# Patient Record
Sex: FEMALE | Race: BLACK OR AFRICAN AMERICAN | Employment: OTHER | URBAN - METROPOLITAN AREA
[De-identification: names, ages, dates, MRNs, and addresses within clinical notes are randomized per-mention and may not be internally consistent; named-entity substitution may affect disease eponyms.]

---

## 2018-01-30 ENCOUNTER — OP HISTORICAL/CONVERTED ENCOUNTER (OUTPATIENT)
Dept: OTHER | Age: 73
End: 2018-01-30

## 2018-06-07 ENCOUNTER — OP HISTORICAL/CONVERTED ENCOUNTER (OUTPATIENT)
Dept: OTHER | Age: 73
End: 2018-06-07

## 2019-03-11 ENCOUNTER — OP HISTORICAL/CONVERTED ENCOUNTER (OUTPATIENT)
Dept: OTHER | Age: 74
End: 2019-03-11

## 2019-03-26 ENCOUNTER — OP HISTORICAL/CONVERTED ENCOUNTER (OUTPATIENT)
Dept: OTHER | Age: 74
End: 2019-03-26

## 2019-04-08 ENCOUNTER — OP HISTORICAL/CONVERTED ENCOUNTER (OUTPATIENT)
Dept: OTHER | Age: 74
End: 2019-04-08

## 2020-03-11 ENCOUNTER — OP HISTORICAL/CONVERTED ENCOUNTER (OUTPATIENT)
Dept: OTHER | Age: 75
End: 2020-03-11

## 2021-03-08 ENCOUNTER — TRANSCRIBE ORDER (OUTPATIENT)
Dept: SCHEDULING | Age: 76
End: 2021-03-08

## 2021-03-08 DIAGNOSIS — Z12.31 SCREENING MAMMOGRAM FOR HIGH-RISK PATIENT: Primary | ICD-10-CM

## 2021-03-11 ENCOUNTER — HOSPITAL ENCOUNTER (OUTPATIENT)
Dept: MAMMOGRAPHY | Age: 76
Discharge: HOME | End: 2021-03-11
Attending: OBSTETRICS & GYNECOLOGY
Payer: MEDICARE

## 2021-03-11 DIAGNOSIS — Z12.31 SCREENING MAMMOGRAM FOR HIGH-RISK PATIENT: ICD-10-CM

## 2021-03-11 PROCEDURE — 77063 BREAST TOMOSYNTHESIS BI: CPT

## 2021-04-29 ENCOUNTER — OFFICE VISIT (OUTPATIENT)
Dept: OBGYN CLINIC | Age: 76
End: 2021-04-29
Payer: MEDICARE

## 2021-04-29 VITALS
SYSTOLIC BLOOD PRESSURE: 128 MMHG | DIASTOLIC BLOOD PRESSURE: 59 MMHG | RESPIRATION RATE: 16 BRPM | WEIGHT: 150 LBS | HEART RATE: 57 BPM | BODY MASS INDEX: 27.6 KG/M2 | HEIGHT: 62 IN | OXYGEN SATURATION: 98 %

## 2021-04-29 DIAGNOSIS — Z01.419 GYNECOLOGIC EXAM NORMAL: Primary | ICD-10-CM

## 2021-04-29 DIAGNOSIS — Z12.72 SCREENING FOR MALIGNANT NEOPLASM OF VAGINA AFTER TOTAL HYSTERECTOMY: ICD-10-CM

## 2021-04-29 DIAGNOSIS — Z90.710 SCREENING FOR MALIGNANT NEOPLASM OF VAGINA AFTER TOTAL HYSTERECTOMY: ICD-10-CM

## 2021-04-29 PROCEDURE — G8510 SCR DEP NEG, NO PLAN REQD: HCPCS | Performed by: OBSTETRICS & GYNECOLOGY

## 2021-04-29 PROCEDURE — G8427 DOCREV CUR MEDS BY ELIG CLIN: HCPCS | Performed by: OBSTETRICS & GYNECOLOGY

## 2021-04-29 PROCEDURE — G8419 CALC BMI OUT NRM PARAM NOF/U: HCPCS | Performed by: OBSTETRICS & GYNECOLOGY

## 2021-04-29 PROCEDURE — G0101 CA SCREEN;PELVIC/BREAST EXAM: HCPCS | Performed by: OBSTETRICS & GYNECOLOGY

## 2021-04-29 PROCEDURE — G8536 NO DOC ELDER MAL SCRN: HCPCS | Performed by: OBSTETRICS & GYNECOLOGY

## 2021-04-29 NOTE — PATIENT INSTRUCTIONS
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
## 2021-05-03 LAB
CYTOLOGIST CVX/VAG CYTO: NORMAL
CYTOLOGY CVX/VAG DOC CYTO: NORMAL
CYTOLOGY CVX/VAG DOC THIN PREP: NORMAL
DX ICD CODE: NORMAL
LABCORP, 190119: NORMAL
Lab: NORMAL
Lab: NORMAL
OTHER STN SPEC: NORMAL
STAT OF ADQ CVX/VAG CYTO-IMP: NORMAL

## 2021-11-02 ENCOUNTER — TRANSCRIBE ORDER (OUTPATIENT)
Dept: SCHEDULING | Age: 76
End: 2021-11-02

## 2021-11-02 DIAGNOSIS — Z12.31 SCREENING MAMMOGRAM FOR HIGH-RISK PATIENT: Primary | ICD-10-CM

## 2022-03-16 ENCOUNTER — HOSPITAL ENCOUNTER (OUTPATIENT)
Dept: MAMMOGRAPHY | Age: 77
Discharge: HOME | End: 2022-03-16
Payer: MEDICARE

## 2022-03-16 DIAGNOSIS — Z12.31 SCREENING MAMMOGRAM FOR HIGH-RISK PATIENT: ICD-10-CM

## 2022-03-16 PROCEDURE — 77067 SCR MAMMO BI INCL CAD: CPT

## 2022-03-17 ENCOUNTER — HOSPITAL ENCOUNTER (OUTPATIENT)
Dept: MAMMOGRAPHY | Age: 77
Discharge: HOME | End: 2022-03-17
Payer: MEDICARE

## 2022-03-17 DIAGNOSIS — R92.8 ABNORMAL MAMMOGRAM: ICD-10-CM

## 2022-03-17 PROCEDURE — 77061 BREAST TOMOSYNTHESIS UNI: CPT

## 2022-03-17 PROCEDURE — 76642 ULTRASOUND BREAST LIMITED: CPT

## 2022-03-21 ENCOUNTER — TRANSCRIBE ORDER (OUTPATIENT)
Dept: SCHEDULING | Age: 77
End: 2022-03-21

## 2022-03-23 ENCOUNTER — HOSPITAL ENCOUNTER (OUTPATIENT)
Dept: MAMMOGRAPHY | Age: 77
Discharge: HOME | End: 2022-03-23
Payer: MEDICARE

## 2022-03-23 DIAGNOSIS — N63.20 LEFT BREAST MASS: ICD-10-CM

## 2022-03-23 DIAGNOSIS — R92.8 ABNORMAL MAMMOGRAM: ICD-10-CM

## 2022-03-23 PROCEDURE — 88305 TISSUE EXAM BY PATHOLOGIST: CPT

## 2022-03-23 PROCEDURE — 19083 BX BREAST 1ST LESION US IMAG: CPT

## 2022-03-23 PROCEDURE — 77061 BREAST TOMOSYNTHESIS UNI: CPT

## 2022-04-04 ENCOUNTER — NURSE NAVIGATOR (OUTPATIENT)
Dept: CASE MANAGEMENT | Age: 77
End: 2022-04-04

## 2022-04-04 NOTE — NURSE NAVIGATOR
[employer]  Breast Navigator Encounter    Name: Barb Jiang  Age: 67 y.o.  : 1945  Diagnosis: Left breast ILC; ER+/AK+/HER2-; Ki67: 14.52%    Encounter type:  [x]Initial Navigator Encounter  []Patient Initiated  []Navigator Follow-up []Pre-op  []Post-op []Check-in Prior to First Treatment []Treatment Modality Change   []Other:     Narrative:   Called pt to introduce self/role of Breast Navigator and to discuss next steps. No answer and unable to leave VM d/t full mailbox.        Interdisciplinary Team:  Med-Onc:  Surg-Onc:  Rad-Onc:  Plastics:  :   Nurse Navigator:  ROSE MARIE Corbin BSN, RN, [facility]  Breast Cancer Nurse [address] [place] [address] Nw  W: [phone]  F: [phone]  [email or web address]   Good Help to Those in [facility]

## 2022-04-11 ENCOUNTER — TRANSCRIBE ORDER (OUTPATIENT)
Dept: SCHEDULING | Age: 77
End: 2022-04-11

## 2022-04-11 DIAGNOSIS — Z85.3 HISTORY OF LEFT BREAST CANCER: Primary | ICD-10-CM

## 2022-04-11 DIAGNOSIS — C50.112 MALIGNANT NEOPLASM OF CENTRAL PORTION OF LEFT FEMALE BREAST (HCC): ICD-10-CM

## 2022-04-19 ENCOUNTER — HOSPITAL ENCOUNTER (OUTPATIENT)
Dept: MRI IMAGING | Age: 77
Discharge: HOME | End: 2022-04-19
Attending: SURGERY

## 2022-04-19 DIAGNOSIS — C50.112 MALIGNANT NEOPLASM OF CENTRAL PORTION OF LEFT FEMALE BREAST (HCC): ICD-10-CM

## 2022-04-19 DIAGNOSIS — Z85.3 HISTORY OF LEFT BREAST CANCER: ICD-10-CM

## 2022-04-28 ENCOUNTER — HOSPITAL ENCOUNTER (OUTPATIENT)
Dept: PREADMISSION TESTING | Age: 77
Discharge: HOME | End: 2022-04-28
Payer: MEDICARE

## 2022-04-28 VITALS
SYSTOLIC BLOOD PRESSURE: 153 MMHG | HEIGHT: 61 IN | OXYGEN SATURATION: 98 % | DIASTOLIC BLOOD PRESSURE: 77 MMHG | TEMPERATURE: 98 F | HEART RATE: 59 BPM | WEIGHT: 147.8 LBS | RESPIRATION RATE: 16 BRPM | BODY MASS INDEX: 27.9 KG/M2

## 2022-04-28 LAB
ATRIAL RATE: 64 BPM
CALCULATED P AXIS, ECG09: 66 DEGREES
CALCULATED R AXIS, ECG10: 9 DEGREES
CALCULATED T AXIS, ECG11: 40 DEGREES
DIAGNOSIS, 93000: NORMAL
ERYTHROCYTE [DISTWIDTH] IN BLOOD BY AUTOMATED COUNT: 13.2 % (ref 11.5–14.5)
HCT VFR BLD AUTO: 37.1 % (ref 35–47)
HGB BLD-MCNC: 12.1 G/DL (ref 11.5–16)
MCH RBC QN AUTO: 27.4 PG (ref 26–34)
MCHC RBC AUTO-ENTMCNC: 32.6 G/DL (ref 30–36.5)
MCV RBC AUTO: 84.1 FL (ref 80–99)
NRBC # BLD: 0 K/UL (ref 0–0.01)
NRBC BLD-RTO: 0 PER 100 WBC
P-R INTERVAL, ECG05: 170 MS
PLATELET # BLD AUTO: 176 K/UL (ref 150–400)
PMV BLD AUTO: 10.1 FL (ref 8.9–12.9)
Q-T INTERVAL, ECG07: 388 MS
QRS DURATION, ECG06: 72 MS
QTC CALCULATION (BEZET), ECG08: 400 MS
RBC # BLD AUTO: 4.41 M/UL (ref 3.8–5.2)
VENTRICULAR RATE, ECG03: 64 BPM
WBC # BLD AUTO: 5.9 K/UL (ref 3.6–11)

## 2022-04-28 PROCEDURE — 85027 COMPLETE CBC AUTOMATED: CPT

## 2022-04-28 PROCEDURE — 93005 ELECTROCARDIOGRAM TRACING: CPT

## 2022-04-28 PROCEDURE — 36415 COLL VENOUS BLD VENIPUNCTURE: CPT

## 2022-04-28 PROCEDURE — 83036 HEMOGLOBIN GLYCOSYLATED A1C: CPT

## 2022-04-28 RX ORDER — CHOLECALCIFEROL (VITAMIN D3) 125 MCG
2000 CAPSULE ORAL 2 TIMES DAILY
COMMUNITY

## 2022-04-28 RX ORDER — LATANOPROST 50 UG/ML
1 SOLUTION/ DROPS OPHTHALMIC
COMMUNITY

## 2022-04-28 RX ORDER — METFORMIN HYDROCHLORIDE 500 MG/1
500 TABLET ORAL 2 TIMES DAILY WITH MEALS
COMMUNITY

## 2022-04-28 RX ORDER — TIZANIDINE HYDROCHLORIDE 4 MG/1
4 CAPSULE, GELATIN COATED ORAL
Status: ON HOLD | COMMUNITY
End: 2022-07-20

## 2022-04-28 RX ORDER — LEVOTHYROXINE SODIUM 50 UG/1
50 TABLET ORAL
COMMUNITY

## 2022-04-28 RX ORDER — DIPHENHYDRAMINE HCL 25 MG
50 TABLET ORAL
COMMUNITY
End: 2022-09-20

## 2022-04-28 RX ORDER — DEXTROMETHORPHAN HYDROBROMIDE, GUAIFENESIN 5; 100 MG/5ML; MG/5ML
650 LIQUID ORAL AS NEEDED
COMMUNITY

## 2022-04-28 NOTE — ROUTINE PROCESS
Requested most recent office note from PCP Dr Ino Liu and cardiologist Dr Obregon Staff. PMH and EKG discussed with Dr Beti Llamas- he states okay to proceed with surgery as planned without clearance. Patient stated that Dr Daily Jimenez instructed her to stop aspirin for 7 days prior to surgery- she states she takes an OTC aspirin 81mg as instructed by her cardiologist- spoke with nurse at Dr Obregon Staff office who confirmed that the aspirin 81 mg is OTC and not prescribed, and okay to stop as instructed by surgeon.

## 2022-04-29 LAB
EST. AVERAGE GLUCOSE BLD GHB EST-MCNC: 192 MG/DL
HBA1C MFR BLD: 8.3 % (ref 4–5.6)

## 2022-05-02 NOTE — PERIOP NOTES
5062 Dr. Annalee Durbin office called, left a message for assistant to make Dr. Barb Beach aware of hgb A1C 8.3 and requested call back that message was received.

## 2022-05-03 NOTE — PERIOP NOTES
1800 Dr. Pablito Saldana returned call to PAT dept, made aware of hgb A1C 8.3. No new orders given, stated ok to proceed with surgery as planned.

## 2022-05-03 NOTE — PERIOP NOTES
0900 Dr. Bryan Bain office called, left a message for Guillermo Wagoner,  in follow up to message left on 5/2/2022 re: hgb A1C 8.3 and left a message for Dr. Nicolasa Menezes on voicemail requesting call to [facility] dept.

## 2022-05-04 ENCOUNTER — ANESTHESIA (OUTPATIENT)
Dept: SURGERY | Age: 77
End: 2022-05-04
Payer: MEDICARE

## 2022-05-04 ENCOUNTER — HOSPITAL ENCOUNTER (OUTPATIENT)
Age: 77
Discharge: HOME | End: 2022-05-04
Attending: SURGERY | Admitting: SURGERY
Payer: MEDICARE

## 2022-05-04 ENCOUNTER — ANESTHESIA EVENT (OUTPATIENT)
Dept: SURGERY | Age: 77
End: 2022-05-04
Payer: MEDICARE

## 2022-05-04 VITALS
HEIGHT: 62 IN | OXYGEN SATURATION: 97 % | HEART RATE: 63 BPM | BODY MASS INDEX: 27.05 KG/M2 | WEIGHT: 147 LBS | SYSTOLIC BLOOD PRESSURE: 160 MMHG | RESPIRATION RATE: 16 BRPM | TEMPERATURE: 97.4 F | DIASTOLIC BLOOD PRESSURE: 78 MMHG

## 2022-05-04 DIAGNOSIS — Z85.3 HISTORY OF LEFT BREAST CANCER: Primary | ICD-10-CM

## 2022-05-04 LAB
GLUCOSE BLD STRIP.AUTO-MCNC: 202 MG/DL (ref 65–117)
GLUCOSE BLD STRIP.AUTO-MCNC: 203 MG/DL (ref 65–117)
PERFORMED BY, TECHID: ABNORMAL
PERFORMED BY, TECHID: ABNORMAL
POTASSIUM SERPL-SCNC: 3.7 MMOL/L (ref 3.5–5.1)

## 2022-05-04 PROCEDURE — 77030040361 HC SLV COMPR DVT MDII -B: Performed by: SURGERY

## 2022-05-04 PROCEDURE — 76060000034 HC ANESTHESIA 1.5 TO 2 HR: Performed by: SURGERY

## 2022-05-04 PROCEDURE — 77030010507 HC ADH SKN DERMBND J&J -B: Performed by: SURGERY

## 2022-05-04 PROCEDURE — 36415 COLL VENOUS BLD VENIPUNCTURE: CPT

## 2022-05-04 PROCEDURE — 2709999900 HC NON-CHARGEABLE SUPPLY: Performed by: SURGERY

## 2022-05-04 PROCEDURE — 74011000636 HC RX REV CODE- 636: Performed by: SURGERY

## 2022-05-04 PROCEDURE — 74011000250 HC RX REV CODE- 250: Performed by: ANESTHESIOLOGY

## 2022-05-04 PROCEDURE — 76210000006 HC OR PH I REC 0.5 TO 1 HR: Performed by: SURGERY

## 2022-05-04 PROCEDURE — 77030002966 HC SUT PDS J&J -A: Performed by: SURGERY

## 2022-05-04 PROCEDURE — 74011250637 HC RX REV CODE- 250/637: Performed by: SURGERY

## 2022-05-04 PROCEDURE — 77030031139 HC SUT VCRL2 J&J -A: Performed by: SURGERY

## 2022-05-04 PROCEDURE — 77030013079 HC BLNKT BAIR HGGR 3M -A: Performed by: ANESTHESIOLOGY

## 2022-05-04 PROCEDURE — 77030019908 HC STETH ESOPH SIMS -A: Performed by: ANESTHESIOLOGY

## 2022-05-04 PROCEDURE — 77030008684 HC TU ET CUF COVD -B: Performed by: ANESTHESIOLOGY

## 2022-05-04 PROCEDURE — 76210000026 HC REC RM PH II 1 TO 1.5 HR: Performed by: SURGERY

## 2022-05-04 PROCEDURE — 74011250636 HC RX REV CODE- 250/636: Performed by: SURGERY

## 2022-05-04 PROCEDURE — 82962 GLUCOSE BLOOD TEST: CPT

## 2022-05-04 PROCEDURE — 77030002933 HC SUT MCRYL J&J -A: Performed by: SURGERY

## 2022-05-04 PROCEDURE — 74011000250 HC RX REV CODE- 250: Performed by: NURSE ANESTHETIST, CERTIFIED REGISTERED

## 2022-05-04 PROCEDURE — 74011250636 HC RX REV CODE- 250/636: Performed by: NURSE ANESTHETIST, CERTIFIED REGISTERED

## 2022-05-04 PROCEDURE — 88307 TISSUE EXAM BY PATHOLOGIST: CPT

## 2022-05-04 PROCEDURE — 74011000250 HC RX REV CODE- 250: Performed by: SURGERY

## 2022-05-04 PROCEDURE — 77030026438 HC STYL ET INTUB CARD -A: Performed by: ANESTHESIOLOGY

## 2022-05-04 PROCEDURE — 84132 ASSAY OF SERUM POTASSIUM: CPT

## 2022-05-04 PROCEDURE — 76010000153 HC OR TIME 1.5 TO 2 HR: Performed by: SURGERY

## 2022-05-04 RX ORDER — SODIUM CHLORIDE, SODIUM LACTATE, POTASSIUM CHLORIDE, CALCIUM CHLORIDE 600; 310; 30; 20 MG/100ML; MG/100ML; MG/100ML; MG/100ML
INJECTION, SOLUTION INTRAVENOUS
Status: DISCONTINUED | OUTPATIENT
Start: 2022-05-04 | End: 2022-05-04 | Stop reason: HOSPADM

## 2022-05-04 RX ORDER — MIDAZOLAM HYDROCHLORIDE 1 MG/ML
1 INJECTION, SOLUTION INTRAMUSCULAR; INTRAVENOUS AS NEEDED
Status: DISCONTINUED | OUTPATIENT
Start: 2022-05-04 | End: 2022-05-04 | Stop reason: HOSPADM

## 2022-05-04 RX ORDER — HYDROCODONE BITARTRATE AND ACETAMINOPHEN 5; 325 MG/1; MG/1
1 TABLET ORAL
Qty: 15 TABLET | Refills: 0 | Status: SHIPPED | OUTPATIENT
Start: 2022-05-04 | End: 2022-05-07

## 2022-05-04 RX ORDER — HYDROCODONE BITARTRATE AND ACETAMINOPHEN 5; 325 MG/1; MG/1
1 TABLET ORAL
Status: DISCONTINUED | OUTPATIENT
Start: 2022-05-04 | End: 2022-05-04 | Stop reason: HOSPADM

## 2022-05-04 RX ORDER — LIDOCAINE AND PRILOCAINE 25; 25 MG/G; MG/G
CREAM TOPICAL ONCE
Status: COMPLETED | OUTPATIENT
Start: 2022-05-04 | End: 2022-05-04

## 2022-05-04 RX ORDER — SODIUM CHLORIDE 0.9 % (FLUSH) 0.9 %
5-40 SYRINGE (ML) INJECTION AS NEEDED
Status: DISCONTINUED | OUTPATIENT
Start: 2022-05-04 | End: 2022-05-04 | Stop reason: HOSPADM

## 2022-05-04 RX ORDER — CEFAZOLIN SODIUM 1 G/3ML
INJECTION, POWDER, FOR SOLUTION INTRAMUSCULAR; INTRAVENOUS
Status: COMPLETED
Start: 2022-05-04 | End: 2022-05-04

## 2022-05-04 RX ORDER — NORETHINDRONE AND ETHINYL ESTRADIOL 0.5-0.035
KIT ORAL AS NEEDED
Status: DISCONTINUED | OUTPATIENT
Start: 2022-05-04 | End: 2022-05-04 | Stop reason: HOSPADM

## 2022-05-04 RX ORDER — LIDOCAINE HYDROCHLORIDE 10 MG/ML
0.1 INJECTION, SOLUTION EPIDURAL; INFILTRATION; INTRACAUDAL; PERINEURAL AS NEEDED
Status: DISCONTINUED | OUTPATIENT
Start: 2022-05-04 | End: 2022-05-04 | Stop reason: HOSPADM

## 2022-05-04 RX ORDER — SODIUM CHLORIDE 0.9 % (FLUSH) 0.9 %
5-40 SYRINGE (ML) INJECTION EVERY 8 HOURS
Status: DISCONTINUED | OUTPATIENT
Start: 2022-05-04 | End: 2022-05-04 | Stop reason: HOSPADM

## 2022-05-04 RX ORDER — ONDANSETRON 2 MG/ML
4 INJECTION INTRAMUSCULAR; INTRAVENOUS AS NEEDED
Status: DISCONTINUED | OUTPATIENT
Start: 2022-05-04 | End: 2022-05-04 | Stop reason: HOSPADM

## 2022-05-04 RX ORDER — MORPHINE SULFATE 2 MG/ML
2 INJECTION, SOLUTION INTRAMUSCULAR; INTRAVENOUS
Status: DISCONTINUED | OUTPATIENT
Start: 2022-05-04 | End: 2022-05-04 | Stop reason: HOSPADM

## 2022-05-04 RX ORDER — BUPIVACAINE HYDROCHLORIDE 2.5 MG/ML
INJECTION, SOLUTION EPIDURAL; INFILTRATION; INTRACAUDAL AS NEEDED
Status: DISCONTINUED | OUTPATIENT
Start: 2022-05-04 | End: 2022-05-04 | Stop reason: HOSPADM

## 2022-05-04 RX ORDER — FENTANYL CITRATE 50 UG/ML
INJECTION, SOLUTION INTRAMUSCULAR; INTRAVENOUS AS NEEDED
Status: DISCONTINUED | OUTPATIENT
Start: 2022-05-04 | End: 2022-05-04 | Stop reason: HOSPADM

## 2022-05-04 RX ORDER — DEXAMETHASONE SODIUM PHOSPHATE 4 MG/ML
INJECTION, SOLUTION INTRA-ARTICULAR; INTRALESIONAL; INTRAMUSCULAR; INTRAVENOUS; SOFT TISSUE AS NEEDED
Status: DISCONTINUED | OUTPATIENT
Start: 2022-05-04 | End: 2022-05-04 | Stop reason: HOSPADM

## 2022-05-04 RX ORDER — MIDAZOLAM HYDROCHLORIDE 1 MG/ML
0.5 INJECTION, SOLUTION INTRAMUSCULAR; INTRAVENOUS
Status: DISCONTINUED | OUTPATIENT
Start: 2022-05-04 | End: 2022-05-04 | Stop reason: HOSPADM

## 2022-05-04 RX ORDER — CEFAZOLIN SODIUM 1 G/3ML
INJECTION, POWDER, FOR SOLUTION INTRAMUSCULAR; INTRAVENOUS AS NEEDED
Status: DISCONTINUED | OUTPATIENT
Start: 2022-05-04 | End: 2022-05-04 | Stop reason: HOSPADM

## 2022-05-04 RX ORDER — PROPOFOL 10 MG/ML
INJECTION, EMULSION INTRAVENOUS AS NEEDED
Status: DISCONTINUED | OUTPATIENT
Start: 2022-05-04 | End: 2022-05-04 | Stop reason: HOSPADM

## 2022-05-04 RX ORDER — LIDOCAINE HYDROCHLORIDE 20 MG/ML
INJECTION, SOLUTION EPIDURAL; INFILTRATION; INTRACAUDAL; PERINEURAL AS NEEDED
Status: DISCONTINUED | OUTPATIENT
Start: 2022-05-04 | End: 2022-05-04 | Stop reason: HOSPADM

## 2022-05-04 RX ORDER — DIPHENHYDRAMINE HYDROCHLORIDE 50 MG/ML
12.5 INJECTION, SOLUTION INTRAMUSCULAR; INTRAVENOUS AS NEEDED
Status: DISCONTINUED | OUTPATIENT
Start: 2022-05-04 | End: 2022-05-04 | Stop reason: HOSPADM

## 2022-05-04 RX ORDER — DEXMEDETOMIDINE HYDROCHLORIDE 100 UG/ML
INJECTION, SOLUTION INTRAVENOUS AS NEEDED
Status: DISCONTINUED | OUTPATIENT
Start: 2022-05-04 | End: 2022-05-04 | Stop reason: HOSPADM

## 2022-05-04 RX ORDER — OXYCODONE AND ACETAMINOPHEN 5; 325 MG/1; MG/1
1 TABLET ORAL AS NEEDED
Status: DISCONTINUED | OUTPATIENT
Start: 2022-05-04 | End: 2022-05-04 | Stop reason: HOSPADM

## 2022-05-04 RX ORDER — NORETHINDRONE AND ETHINYL ESTRADIOL 0.5-0.035
5 KIT ORAL AS NEEDED
Status: DISCONTINUED | OUTPATIENT
Start: 2022-05-04 | End: 2022-05-04 | Stop reason: HOSPADM

## 2022-05-04 RX ORDER — LIDOCAINE HYDROCHLORIDE 10 MG/ML
10 INJECTION INFILTRATION; PERINEURAL ONCE
Status: DISCONTINUED | OUTPATIENT
Start: 2022-05-04 | End: 2022-05-04 | Stop reason: HOSPADM

## 2022-05-04 RX ORDER — ONDANSETRON 2 MG/ML
INJECTION INTRAMUSCULAR; INTRAVENOUS AS NEEDED
Status: DISCONTINUED | OUTPATIENT
Start: 2022-05-04 | End: 2022-05-04 | Stop reason: HOSPADM

## 2022-05-04 RX ORDER — SODIUM CHLORIDE, SODIUM LACTATE, POTASSIUM CHLORIDE, CALCIUM CHLORIDE 600; 310; 30; 20 MG/100ML; MG/100ML; MG/100ML; MG/100ML
20 INJECTION, SOLUTION INTRAVENOUS CONTINUOUS
Status: DISCONTINUED | OUTPATIENT
Start: 2022-05-04 | End: 2022-05-04 | Stop reason: HOSPADM

## 2022-05-04 RX ADMIN — HYDROCODONE BITARTRATE AND ACETAMINOPHEN 1 TABLET: 5; 325 TABLET ORAL at 12:35

## 2022-05-04 RX ADMIN — SODIUM CHLORIDE, POTASSIUM CHLORIDE, SODIUM LACTATE AND CALCIUM CHLORIDE: 600; 310; 30; 20 INJECTION, SOLUTION INTRAVENOUS at 09:47

## 2022-05-04 RX ADMIN — SODIUM CHLORIDE, POTASSIUM CHLORIDE, SODIUM LACTATE AND CALCIUM CHLORIDE 20 ML/HR: 600; 310; 30; 20 INJECTION, SOLUTION INTRAVENOUS at 08:36

## 2022-05-04 RX ADMIN — FENTANYL CITRATE 50 MCG: 50 INJECTION, SOLUTION INTRAMUSCULAR; INTRAVENOUS at 10:06

## 2022-05-04 RX ADMIN — LIDOCAINE HYDROCHLORIDE 80 MG: 20 INJECTION, SOLUTION EPIDURAL; INFILTRATION; INTRACAUDAL; PERINEURAL at 09:51

## 2022-05-04 RX ADMIN — PHENYLEPHRINE HYDROCHLORIDE 100 MCG: 10 INJECTION INTRAVENOUS at 10:14

## 2022-05-04 RX ADMIN — CEFAZOLIN SODIUM 2 G: 1 INJECTION, POWDER, FOR SOLUTION INTRAMUSCULAR; INTRAVENOUS at 09:59

## 2022-05-04 RX ADMIN — LIDOCAINE AND PRILOCAINE: 25; 25 CREAM TOPICAL at 09:06

## 2022-05-04 RX ADMIN — PROPOFOL 150 MG: 10 INJECTION, EMULSION INTRAVENOUS at 09:51

## 2022-05-04 RX ADMIN — PHENYLEPHRINE HYDROCHLORIDE 100 MCG: 10 INJECTION INTRAVENOUS at 10:10

## 2022-05-04 RX ADMIN — ONDANSETRON 4 MG: 2 INJECTION INTRAMUSCULAR; INTRAVENOUS at 09:56

## 2022-05-04 RX ADMIN — EPHEDRINE SULFATE 10 MG: 50 INJECTION INTRAVENOUS at 10:15

## 2022-05-04 RX ADMIN — PHENYLEPHRINE HYDROCHLORIDE 100 MCG: 10 INJECTION INTRAVENOUS at 10:06

## 2022-05-04 RX ADMIN — EPHEDRINE SULFATE 10 MG: 50 INJECTION INTRAVENOUS at 10:25

## 2022-05-04 RX ADMIN — DEXAMETHASONE SODIUM PHOSPHATE 4 MG: 4 INJECTION, SOLUTION INTRA-ARTICULAR; INTRALESIONAL; INTRAMUSCULAR; INTRAVENOUS; SOFT TISSUE at 09:56

## 2022-05-04 RX ADMIN — FENTANYL CITRATE 50 MCG: 50 INJECTION, SOLUTION INTRAMUSCULAR; INTRAVENOUS at 09:51

## 2022-05-04 RX ADMIN — EPHEDRINE SULFATE 5 MG: 50 INJECTION INTRAVENOUS at 10:52

## 2022-05-04 RX ADMIN — DEXMEDETOMIDINE HYDROCHLORIDE 4 MCG: 100 INJECTION, SOLUTION INTRAVENOUS at 10:50

## 2022-05-04 NOTE — DISCHARGE INSTRUCTIONS
Patient Education        [place] Node Biopsy for Breast Cancer: What to Expect at [facility]  After a sentinel node biopsy, many people have no side effects. Some people have pain or bruising at the cut (incision) and feel tired. Your breast and underarm area may be slightly swollen. This may last a few days. You should feel close to normal in a few days. The incision the doctor made usually heals in about 2 weeks. The scar usually fades with time. Some people have a buildup of fluid in the area where the lymph nodes were removed. This is known as seroma. This goes away on its own, or your doctor can drain it. When you had this test, your doctor injected blue dye or radioactive material (or both) into your breast. The blue dye may give your breast a bluish color and turn your urine green for about 24 hours. The radioactive material leaves the body on its own in 24 to 48 hours. A sentinel node biopsy may be done at the same time as other breast surgeries. If this is the case, how you recover will be different. This care sheet gives you a general idea about how long it will take for you to recover. But each person recovers at a different pace. Follow the steps below to get better as quickly as possible. How can you care for yourself at home? Activity    · Rest when you feel tired. Getting enough sleep will help you recover.     · Try to walk each day. Start by walking a little more than you did the day before. Bit by bit, increase the amount you walk. Walking boosts blood flow and helps prevent pneumonia and constipation.     · You may drive when you are no longer taking pain medicine and you feel up to it.     · You can lift things when you feel comfortable doing so.     · Most women return to work and their normal routines in 2 to 7 days.     · You may shower 24 to 48 hours after surgery, if your doctor okays it. Pat the incision dry.  Do not take a bath for the first 2 weeks, or until your doctor tells you it is okay.     · Avoid activity or exercise that may put stress on the cut. This includes washing windows, vacuuming, or gardening with the affected arm. Diet    · You can eat your normal diet. If your stomach is upset, try bland, low-fat foods like plain rice, broiled chicken, toast, and yogurt.     · You may notice that your bowels are not regular right after your surgery. This is common. Try to avoid constipation and straining with bowel movements. Take a fiber supplement such as Citrucel or Metamucil every day. If you have not had a bowel movement after a couple of days, take a mild laxative. Medicines    · Your doctor will tell you if and when you can restart your medicines. He or she will also give you instructions about taking any new medicines.     · If you take aspirin or some other blood thinner, ask your doctor if and when to start taking it again. Make sure that you understand exactly what your doctor wants you to do.     · Take pain medicines exactly as directed. ? If the doctor gave you a prescription medicine for pain, take it as prescribed. ? If you are not taking a prescription pain medicine, take an over-the-counter medicine such as acetaminophen (Tylenol), ibuprofen (Advil, Motrin), or naproxen (Aleve). Read and follow all instructions on the label. ? Do not take two or more pain medicines at the same time unless the doctor told you to. Many pain medicines have acetaminophen, which is Tylenol. Too much acetaminophen (Tylenol) can be harmful.     · If your doctor prescribed antibiotics, take them as directed. Do not stop taking them just because you feel better. You need to take the full course of antibiotics.     · If you think your pain medicine is making you sick to your stomach:  ? Take your medicine after meals (unless your doctor has told you not to). ? Ask your doctor for a different pain medicine.    Incision care    · If you have strips of tape on the cut (incision) the doctor made, leave the tape on for about 1 week or until it falls off.     · After you can shower, wash the area daily with warm, soapy water and pat it dry. Follow-up care is a key part of your treatment and safety. Be sure to make and go to all appointments, and call your doctor if you are having problems. It's also a good idea to know your test results and keep a list of the medicines you take. When should you call for help? Call 911 anytime you think you may need emergency care. For example, call if:    · You passed out (lost consciousness).     · You have chest pain, are short of breath, or cough up blood. Call your doctor now or seek immediate medical care if:    · You have pain that does not get better after you take pain medicine.     · You cannot pass stools or gas.     · You are sick to your stomach or cannot drink fluids.     · You have signs of a blood clot in your leg (called a deep vein thrombosis), such as:  ? Pain in your calf, back of the knee, thigh, or groin. ? Redness or swelling in your leg.     · You have signs of infection, such as:  ? Increased pain, swelling, warmth, or redness. ? Red streaks leading from the incision. ? Pus draining from the incision. ? A fever.     · You have loose stitches, or your incision comes open.     · Bright red blood has soaked through the bandage over your incision. Watch closely for changes in your health, and be sure to contact your doctor if:    · You have any problems.     · You have new or worse swelling or pain in your arm. Where can you learn more? Go to http://www.[employer].com/  Enter H004 in the search box to learn more about \"[place] Node Biopsy for Breast Cancer: What to Expect at Home. \"  Current as of: September 8, 2021               Content Version: 13.2  © 4196-8842 Healthwise, Incorporated.    Care instructions adapted under license by [employer] (which disclaims liability or warranty for this information). If you have questions about a medical condition or this instruction, always ask your healthcare professional. [address] any warranty or liability for your use of this information. Please wear the surgical bra as much as possible even when sleeping. If too uncomfortable, your own supportive bra is fine. You may shower tomorrow. A prescription for pain medication has been sent to your pharmacy. You may take 1-2 tabs every 4 6 hours as needed for pain. Resume your home medications. No heavy lifting greater than a gallon of milk for 2 weeks.

## 2022-05-04 NOTE — OP NOTES
DATE: 5/4/2022    PROCEDURE: 1) Left localized partial mastectomy  2) Left sentinel node biopsy 3) injection of vital blue dye 4) intraoperative interpretation of specimen radiograph 5) Soft tissue rearrangement 9 cm by 5 cm. SURGEON: Bebeto Jacksno MD    ASSIST: Coreen Moore    ANESTHESIA: general    EBL: 10 ML    FLUIDS: 1000 ml    FINDINGS: Mass and clip in specimen radiograph, one blue dye and palpable nodes sent for permanent pathologic analysis    COMPLICATIONS: None immediate    DISPO: Patient was woken up and transported to PACU in stable condition. INDICATIONS:    Ms. Ursula Howard is a 67year old woman who presented with an invasive carcinoma of the left breast. She desired breast conservation and therefore was scheduled electively for a left breast partial mastectomy, sentinel node biopsy. Prior to the procedure she expressed understanding of the risks of bleeding, infection, positive margin and need for further procedures. PROCEDURE: The patient was taken back to the operating room, placed on the operating table in supine position. Bilateral Venodynes were placed. She received a perioperative dose of antibiotics. After successful administration of anesthesia, the operative field was prepped anddraped in usual sterile fashion and a time-out was performed according to standard institutional protocol. I initiated the procedure with the left sentinel node biopsy. Four ML of 1/2 strength methylene blue dye was injected in the parenchymal space and massaged for 10 minutes. Local anesthetic was infiltrated just below the hairline of the left axilla. A circumlinear incision was made. The soft tissues were dissected down with electrocautery. The clavipectoral fascia was entered. I identified one blue node. This was dissected out with electrocautery taking care to clip with feeding lymphatics. The node was sent for frozen pathologic analysis and it was negative for disease.  I removed an additional 4 palpable nodes and sent these for permanent pathologic analysis. The wound bed was irrigated and hemostasis was obtained. Operation performed for curative intent: YES    Tracer used for sentinel node biopsy: blue dye only    At the conclusion of the sentinel node mapping and biopsy procedure:    1. Background mirian basin counts fell to less than 10% of the most radioactive sentinel node (a.k.a. 10% rule fulfilled): N/A    2. Additional blue nodes were visually identified: NO    3. Digital palpation of the mirian basin revealed suspicious adenopathy: NO    4. Biopsy proven positive nodes marked with clips prior to neoadjuvant therapy were identified and removed: N/A    Thus, the SLN procedure was deemed technically successful: YES    If the SLN procedure was not technically successful, then a lymph node dissection was performed: N/A    If the SLN procedure was unsuccessful and a lymph node dissection was not performed, please state reason: N/A      Local anesthetic was infiltrated in the left breast. An elliptical incision was made to include the nipple which was part of the cancer process. The soft tissues were dissected down with electrocautery. The mass was located, excised, marked, and sent for specimen radiograph. This demonstrated the mass and clip in the specimen. The specimen was then sent for permanent pathologic analysis. Additional margins were taken and marked and sent for permanent pathologic analysis. I then proceed to mobilize two layers of breast tissue off of the fascia to close the defect. After the additional tissue mobilization of 2.5 cm on all sides, the defect measured 5 x 9 cm. I then closed this area in two layers with interrupted 3-0 PDS stitches to fill in the empty space taking care to avoid dimpling in the skin. The wound bed was irrigated. Hemostasis was obtained. The incision was closed with interrupted 3-0 Vicryl stitches placed in the deep dermis.  The skin was closed with a running 3-0 PDS in a purse-string fashion to reconstruction a nipple type structure. Appropriate dressing was applied. The patient was woken up and transported to PACU in stable condition.

## 2022-05-04 NOTE — PROGRESS NOTES
Pt stated forgot to bring neurostimulator remote and cannot turn off stimulator. Will call Dr. Drea Veloz to make aware.

## 2022-05-04 NOTE — ROUTINE PROCESS
1155:Pt. Transferred to [address] [facility] [address] via stretcher in stable condition. Bedside report given, SBAR reviewed, sites viewed. Family and belongings to bedside.

## 2022-05-04 NOTE — ANESTHESIA PREPROCEDURE EVALUATION
Relevant Problems   No relevant active problems       Anesthetic History   No history of anesthetic complications            Review of Systems / Medical History  Patient summary reviewed, nursing notes reviewed and pertinent labs reviewed    Pulmonary                   Neuro/Psych     seizures         Cardiovascular    Hypertension      CHF             GI/Hepatic/Renal     GERD           Endo/Other    Diabetes  Hypothyroidism  Cancer ([facility] BREAST)     Other Findings   Comments: Ch. Back pain / right lumbar radiculopathy. s/p spinal cord stimulator implant.           Physical Exam    Airway  Mallampati: II  TM Distance: 4 - 6 cm  Neck ROM: normal range of motion   Mouth opening: Normal     Cardiovascular    Rhythm: regular  Rate: normal         Dental    Dentition: Poor dentition     Pulmonary  Breath sounds clear to auscultation               Abdominal  GI exam deferred       Other Findings            Anesthetic Plan    ASA: 3  Anesthesia type: general          Induction: Intravenous  Anesthetic plan and risks discussed with: Patient

## 2022-05-04 NOTE — ANESTHESIA POSTPROCEDURE EVALUATION
Procedure(s):  LEFT PARTIAL MASTECTOMY WITH SENTINEL NODE BIOPSY.     general    Anesthesia Post Evaluation        Patient location during evaluation: PACU  Patient participation: complete - patient participated  Level of consciousness: awake  Pain score: 0  Pain management: adequate  Airway patency: patent  Anesthetic complications: no  Cardiovascular status: acceptable  Respiratory status: acceptable  Hydration status: acceptable  Post anesthesia nausea and vomiting:  controlled  Final Post Anesthesia Temperature Assessment:  Normothermia (36.0-37.5 degrees C)      INITIAL Post-op Vital signs:   Vitals Value Taken Time   /79 05/04/22 1127   Temp 36.3 °C (97.4 °F) 05/04/22 1121   Pulse 78 05/04/22 1127   Resp 14 05/04/22 1127   SpO2 96 % 05/04/22 1127

## 2022-05-04 NOTE — BRIEF OP NOTE
Brief Postoperative Note    Patient: Mulugeta Andrea  YOB: 1945  MRN: [id number]    Date of Procedure: 5/4/2022     Pre-Op Diagnosis: BREAST CANCER    Post-Op Diagnosis: Same as preoperative diagnosis. Procedure(s):  LEFT PARTIAL MASTECTOMY WITH SENTINEL NODE BIOPSY    Surgeon(s):  Ann Bryant MD    Surgical Assistant: Ki Dominguez    Anesthesia: General     Estimated Blood Loss (mL): Minimal    Complications: None    Specimens:   ID Type Source Tests Collected by Time Destination   1 : Left Breast Lumpectomy Short Stitch Superior Long Stitch Lateral  Preservative Breast  Sadie Wright MD 5/4/2022 1015 Pathology   2 : Superior Margin Preservative Breast  Sadie Wright MD 5/4/2022 1017 Pathology   3 : Medial Margin Preservative Breast  Julio Comment Ann Laughlin MD 5/4/2022 1019 Pathology   4 : Lateral Margin Preservative Breast  Sadie Wright MD 5/4/2022 1021 Pathology   5 :  Inferior Margin    Sadie Wright MD 5/4/2022 1021 Pathology   6 : Deep margin Preservative Breast  Julio Comment Ann Laughlin MD 5/4/2022 1022 Pathology   7 : [place] Node Biopsy Preservative Node  Julio Comment Ann Laughlin MD 5/4/2022 1004 Pathology   8 : Non [place] Node Palpable Preservative Node  Ann Bryant MD 5/4/2022 1036 Pathology        Implants: * No implants in log *    Drains: * No LDAs found *    Findings: clip in specimen, one blue sentinel node    Electronically Signed by Governor MD Arabella on 5/4/2022 at 11:59 AM

## 2022-05-04 NOTE — PROGRESS NOTES
Provider ordered BMP if pt has CKD. Pt stated does not have CKD and BMP to be cancelled. Dr. Hayley Ferreira notified pt does not have neurostimulator, and provider stated needs to be turned off.  went home to get neurotransmitter for pt.

## 2022-05-04 NOTE — PROGRESS NOTES
Family updated via phone procedure finished, pt. Waking up, can see when to phase 2 of recovery, about 30 min.

## 2022-05-04 NOTE — PROGRESS NOTES
Called pharmacy to inquire status of emla cream, sent request, pharmacist stated needs to be verified.

## 2022-05-16 ENCOUNTER — TRANSCRIBE ORDER (OUTPATIENT)
Dept: SCHEDULING | Age: 77
End: 2022-05-16

## 2022-05-16 DIAGNOSIS — C50.012 MALIGNANT NEOPLASM OF NIPPLE AND AREOLA OF FEMALE BREAST, LEFT (HCC): Primary | ICD-10-CM

## 2022-05-28 ENCOUNTER — HOSPITAL ENCOUNTER (OUTPATIENT)
Dept: PET IMAGING | Age: 77
Discharge: HOME | End: 2022-05-28
Attending: SURGERY
Payer: MEDICARE

## 2022-05-28 DIAGNOSIS — C50.012 MALIGNANT NEOPLASM OF NIPPLE AND AREOLA OF FEMALE BREAST, LEFT (HCC): ICD-10-CM

## 2022-05-28 PROCEDURE — A9552 F18 FDG: HCPCS

## 2022-05-28 RX ADMIN — FLUDEOXYGLUCOSE F-18 8.4 MILLICURIE: 200 INJECTION INTRAVENOUS at 11:07

## 2022-05-31 RX ORDER — FLUDEOXYGLUCOSE F-18 200 MCI/ML
8.4 INJECTION INTRAVENOUS ONCE
Status: COMPLETED | OUTPATIENT
Start: 2022-05-31 | End: 2022-05-28

## 2022-06-13 ENCOUNTER — HOSPITAL ENCOUNTER (EMERGENCY)
Age: 77
Discharge: HOME | End: 2022-06-13
Attending: EMERGENCY MEDICINE | Admitting: EMERGENCY MEDICINE
Payer: MEDICARE

## 2022-06-13 ENCOUNTER — APPOINTMENT (OUTPATIENT)
Dept: GENERAL RADIOLOGY | Age: 77
End: 2022-06-13
Attending: EMERGENCY MEDICINE
Payer: MEDICARE

## 2022-06-13 VITALS
WEIGHT: 145 LBS | SYSTOLIC BLOOD PRESSURE: 164 MMHG | DIASTOLIC BLOOD PRESSURE: 71 MMHG | OXYGEN SATURATION: 99 % | TEMPERATURE: 98.3 F | HEART RATE: 58 BPM | HEIGHT: 62 IN | BODY MASS INDEX: 26.68 KG/M2 | RESPIRATION RATE: 20 BRPM

## 2022-06-13 DIAGNOSIS — R06.02 SOB (SHORTNESS OF BREATH): Primary | ICD-10-CM

## 2022-06-13 LAB
ALBUMIN SERPL-MCNC: 3.6 G/DL (ref 3.5–5)
ALBUMIN/GLOB SERPL: 1.4 {RATIO} (ref 1.1–2.2)
ALP SERPL-CCNC: 41 U/L (ref 45–117)
ALT SERPL-CCNC: 16 U/L (ref 12–78)
ANION GAP SERPL CALC-SCNC: 5 MMOL/L (ref 5–15)
AST SERPL W P-5'-P-CCNC: 22 U/L (ref 15–37)
ATRIAL RATE: 64 BPM
BASOPHILS # BLD: 0 K/UL (ref 0–0.1)
BASOPHILS NFR BLD: 1 % (ref 0–1)
BILIRUB SERPL-MCNC: 0.5 MG/DL (ref 0.2–1)
BNP SERPL-MCNC: 112 PG/ML
BUN SERPL-MCNC: 7 MG/DL (ref 6–20)
BUN/CREAT SERPL: 9 (ref 12–20)
CA-I BLD-MCNC: 9.1 MG/DL (ref 8.5–10.1)
CALCULATED P AXIS, ECG09: 12 DEGREES
CALCULATED R AXIS, ECG10: -12 DEGREES
CALCULATED T AXIS, ECG11: 75 DEGREES
CHLORIDE SERPL-SCNC: 112 MMOL/L (ref 97–108)
CO2 SERPL-SCNC: 26 MMOL/L (ref 21–32)
CREAT SERPL-MCNC: 0.76 MG/DL (ref 0.55–1.02)
DEPRECATED S PYO AG THROAT QL EIA: NEGATIVE
DIAGNOSIS, 93000: NORMAL
DIFFERENTIAL METHOD BLD: ABNORMAL
EOSINOPHIL # BLD: 0.1 K/UL (ref 0–0.4)
EOSINOPHIL NFR BLD: 3 % (ref 0–7)
ERYTHROCYTE [DISTWIDTH] IN BLOOD BY AUTOMATED COUNT: 13.6 % (ref 11.5–14.5)
GLOBULIN SER CALC-MCNC: 2.5 G/DL (ref 2–4)
GLUCOSE SERPL-MCNC: 199 MG/DL (ref 65–100)
HCT VFR BLD AUTO: 35.7 % (ref 35–47)
HGB BLD-MCNC: 11.4 G/DL (ref 11.5–16)
IMM GRANULOCYTES # BLD AUTO: 0 K/UL (ref 0–0.04)
IMM GRANULOCYTES NFR BLD AUTO: 0 % (ref 0–0.5)
LYMPHOCYTES # BLD: 1.5 K/UL (ref 0.8–3.5)
LYMPHOCYTES NFR BLD: 36 % (ref 12–49)
MCH RBC QN AUTO: 27.5 PG (ref 26–34)
MCHC RBC AUTO-ENTMCNC: 31.9 G/DL (ref 30–36.5)
MCV RBC AUTO: 86 FL (ref 80–99)
MONOCYTES # BLD: 0.5 K/UL (ref 0–1)
MONOCYTES NFR BLD: 12 % (ref 5–13)
NEUTS SEG # BLD: 2 K/UL (ref 1.8–8)
NEUTS SEG NFR BLD: 48 % (ref 32–75)
NRBC # BLD: 0 K/UL (ref 0–0.01)
NRBC BLD-RTO: 0 PER 100 WBC
P-R INTERVAL, ECG05: 172 MS
PLATELET # BLD AUTO: 125 K/UL (ref 150–400)
PMV BLD AUTO: 11.4 FL (ref 8.9–12.9)
POTASSIUM SERPL-SCNC: 4.3 MMOL/L (ref 3.5–5.1)
PROT SERPL-MCNC: 6.1 G/DL (ref 6.4–8.2)
Q-T INTERVAL, ECG07: 400 MS
QRS DURATION, ECG06: 80 MS
QTC CALCULATION (BEZET), ECG08: 412 MS
RBC # BLD AUTO: 4.15 M/UL (ref 3.8–5.2)
SARS-COV-2, COV2: NORMAL
SODIUM SERPL-SCNC: 143 MMOL/L (ref 136–145)
TROPONIN-HIGH SENSITIVITY: 22 NG/L (ref 0–51)
VENTRICULAR RATE, ECG03: 64 BPM
WBC # BLD AUTO: 4.2 K/UL (ref 3.6–11)

## 2022-06-13 PROCEDURE — 87880 STREP A ASSAY W/OPTIC: CPT

## 2022-06-13 PROCEDURE — 93005 ELECTROCARDIOGRAM TRACING: CPT

## 2022-06-13 PROCEDURE — 83880 ASSAY OF NATRIURETIC PEPTIDE: CPT

## 2022-06-13 PROCEDURE — 85025 COMPLETE CBC W/AUTO DIFF WBC: CPT

## 2022-06-13 PROCEDURE — 71045 X-RAY EXAM CHEST 1 VIEW: CPT

## 2022-06-13 PROCEDURE — 84484 ASSAY OF TROPONIN QUANT: CPT

## 2022-06-13 PROCEDURE — 80053 COMPREHEN METABOLIC PANEL: CPT

## 2022-06-13 PROCEDURE — U0005 INFEC AGEN DETEC AMPLI PROBE: HCPCS

## 2022-06-13 PROCEDURE — 99285 EMERGENCY DEPT VISIT HI MDM: CPT
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
## 2022-06-13 NOTE — DISCHARGE INSTRUCTIONS
You were seen in the ER for your COVID-like symptoms. You should act as though you are positive even if your swab returns negative. Thankfully, your vital signs are all normal, including your oxygen and your heart rate. You should take tylenol or ibuprofen for fever, aches and pains for the next few days. You can alternate these every 3 hours so that you always have something on board. For instance, you can take tylenol at 9am, ibuprofen at noon, tylenol again at 3pm and ibuprofen again at 6pm. Take in plenty of water to stay hydrated and follow up with your primary care doctor in the next few days. Return to the ER for any uncontrollable vomiting or diarrhea, difficulty breathing, or any other new or concerning symptoms. Thank you! Thank you for allowing me to care for you in the emergency department. I sincerely hope that you are satisfied with your visit today. It is my goal to provide you with excellent care. Below you will find a list of your labs and imaging from your visit today. Should you have any questions regarding these results please do not hesitate to call the emergency department.     Labs -     Recent Results (from the past 12 hour(s))   SARS-COV-2    Collection Time: 06/13/22  7:15 AM   Result Value Ref Range    SARS-CoV-2 by PCR Please find results under separate order     STREP AG SCREEN, GROUP A    Collection Time: 06/13/22  7:28 AM    Specimen: Throat   Result Value Ref Range    Group A Strep Ag ID Negative     CBC WITH AUTOMATED DIFF    Collection Time: 06/13/22  8:20 AM   Result Value Ref Range    WBC 4.2 3.6 - 11.0 K/uL    RBC 4.15 3.80 - 5.20 M/uL    HGB 11.4 (L) 11.5 - 16.0 g/dL    HCT 35.7 35.0 - 47.0 %    MCV 86.0 80.0 - 99.0 FL    MCH 27.5 26.0 - 34.0 PG    MCHC 31.9 30.0 - 36.5 g/dL    RDW 13.6 11.5 - 14.5 %    PLATELET 948 (L) 307 - 400 K/uL    MPV 11.4 8.9 - 12.9 FL    NRBC 0.0 0.0  WBC    ABSOLUTE NRBC 0.00 0.00 - 0.01 K/uL    NEUTROPHILS 48 32 - 75 % LYMPHOCYTES 36 12 - 49 %    MONOCYTES 12 5 - 13 %    EOSINOPHILS 3 0 - 7 %    BASOPHILS 1 0 - 1 %    IMMATURE GRANULOCYTES 0 0 - 0.5 %    ABS. NEUTROPHILS 2.0 1.8 - 8.0 K/UL    ABS. LYMPHOCYTES 1.5 0.8 - 3.5 K/UL    ABS. MONOCYTES 0.5 0.0 - 1.0 K/UL    ABS. EOSINOPHILS 0.1 0.0 - 0.4 K/UL    ABS. BASOPHILS 0.0 0.0 - 0.1 K/UL    ABS. IMM. GRANS. 0.0 0.00 - 0.04 K/UL    DF AUTOMATED     NT-PRO BNP    Collection Time: 06/13/22  8:20 AM   Result Value Ref Range    NT pro- <450 pg/mL   TROPONIN-HIGH SENSITIVITY    Collection Time: 06/13/22  8:20 AM   Result Value Ref Range    Troponin-High Sensitivity 22 0 - 51 ng/L   METABOLIC PANEL, COMPREHENSIVE    Collection Time: 06/13/22  8:20 AM   Result Value Ref Range    Sodium 143 136 - 145 mmol/L    Potassium 4.3 3.5 - 5.1 mmol/L    Chloride 112 (H) 97 - 108 mmol/L    CO2 26 21 - 32 mmol/L    Anion gap 5 5 - 15 mmol/L    Glucose 199 (H) 65 - 100 mg/dL    BUN 7 6 - 20 mg/dL    Creatinine 0.76 0.55 - 1.02 mg/dL    BUN/Creatinine ratio 9 (L) 12 - 20      GFR est AA >60 >60 ml/min/1.73m2    GFR est non-AA >60 >60 ml/min/1.73m2    Calcium 9.1 8.5 - 10.1 mg/dL    Bilirubin, total 0.5 0.2 - 1.0 mg/dL    AST (SGOT) 22 15 - 37 U/L    ALT (SGPT) 16 12 - 78 U/L    Alk. phosphatase 41 (L) 45 - 117 U/L    Protein, total 6.1 (L) 6.4 - 8.2 g/dL    Albumin 3.6 3.5 - 5.0 g/dL    Globulin 2.5 2.0 - 4.0 g/dL    A-G Ratio 1.4 1.1 - 2.2         Radiologic Studies -   XR CHEST SNGL V   Final Result   Underexpanded lungs with bibasilar atelectasis. CT Results  (Last 48 hours)      None          CXR Results  (Last 48 hours)                 06/13/22 0744  XR CHEST SNGL V Final result    Impression:  Underexpanded lungs with bibasilar atelectasis. Narrative:  Chest, frontal view, 6/13/2022       History: Cough. Comparison: Including chest 3/12/2013. Findings: The cardiac silhouette is borderline enlarged. The lungs are   underexpanded with bibasilar atelectasis.   No hydrostatic edema, pleural   effusion or pneumothorax is identified. The osseous structures are grossly   intact. Spinal stimulator lead projects at the thoracic spine. Surgical clips   at the left axilla and cholecystectomy are noted. If you feel that you have not received excellent quality care or timely care, please ask to speak to the nurse manager. Please choose us in the future for your continued health care needs. ------------------------------------------------------------------------------------------------------------  The exam and treatment you received in the Emergency Department were for an urgent problem and are not intended as complete care. It is important that you follow-up with a doctor, nurse practitioner, or physician assistant to:  (1) confirm your diagnosis,  (2) re-evaluation of changes in your illness and treatment, and  (3) for ongoing care. If your symptoms become worse or you do not improve as expected and you are unable to reach your usual health care provider, you should return to the Emergency Department. We are available 24 hours a day. Please take your discharge instructions with you when you go to your follow-up appointment. If you have any problem arranging a follow-up appointment, contact the Emergency Department immediately. If a prescription has been provided, please have it filled as soon as possible to prevent a delay in treatment. Read the entire medication instruction sheet provided to you by the pharmacy. If you have any questions or reservations about taking the medication due to side effects or interactions with other medications, please call your primary care physician or contact the ER to speak with the charge nurse. Make an appointment with your family doctor or the physician you were referred to for follow-up of this visit as instructed on your discharge paperwork, as this is a mandatory follow-up.  Return to the ER if you are unable to be seen or if you are unable to be seen in a timely manner. If you have any problem arranging the follow-up visit, contact the Emergency Department immediately.

## 2022-06-14 ENCOUNTER — PATIENT OUTREACH (OUTPATIENT)
Dept: CASE MANAGEMENT | Age: 77
End: 2022-06-14

## 2022-06-14 LAB
SARS-COV-2, XPLCVT: DETECTED
SOURCE, COVRS: ABNORMAL

## 2022-06-14 NOTE — PROGRESS NOTES
Patient contacted regarding COVID-19 risk. Discussed COVID-19 related testing which was available at this time. Test results were positive. Patient informed of results, if available? yes. LPN Care Coordinator contacted the patient by telephone to perform post discharge assessment. Call within 2 business days of discharge: Yes Verified name and  with patient as identifiers. Provided introduction to self, and explanation of the CTN/ACM role, and reason for call due to risk factors for infection and/or exposure to COVID-19. Symptoms reviewed with patient who verbalized the following symptoms: no worsening symptoms      Due to no new or worsening symptoms encounter was not routed to provider for escalation. Discussed follow-up appointments. If no appointment was previously scheduled, appointment scheduling offered:  no. [facility] follow up appointment(s):   Future Appointments   Date Time Provider Donaldo Garcia   2022  3:00 PM [facility] RAD ONC THERAPY [facility]     [facility] follow up appointment(s): Follow up with PCP. Interventions to address risk factors: Obtained and reviewed discharge summary and/or continuity of care documents     Advance Care Planning:   Does patient have an Advance Directive: decision makers updated. Educated patient about risk for severe COVID-19 due to risk factors according to CDC guidelines. LPN CC reviewed discharge instructions, medical action plan and red flag symptoms with the patient who verbalized understanding. Discussed COVID vaccination status: yes. Education provided on COVID-19 vaccination as appropriate. Discussed exposure protocols and quarantine with CDC Guidelines. Patient was given an opportunity to verbalize any questions and concerns and agrees to contact LPN CC or health care provider for questions related to their healthcare.     Reviewed and educated patient on any new and changed medications related to discharge diagnosis     Was patient discharged with a pulse oximeter? no Discussed and confirmed pulse oximeter discharge instructions and when to notify provider or seek emergency care. LPN CC provided contact information. No further follow-up call identified based on severity of symptoms and risk factors.

## 2022-06-24 ENCOUNTER — HOSPITAL ENCOUNTER (OUTPATIENT)
Dept: RADIATION THERAPY | Age: 77
Discharge: HOME | End: 2022-06-24

## 2022-07-19 NOTE — PERIOP NOTES
Notified Dr. Nevaeh Babb of patient's allergy to penicillin noted with unknown reaction. New pre-op orders received for surgery scheduled on 7-20-22 for clindamycin 600 mg IV once for surgical prophylaxis, discontinue pre-op ancef order and LR 20 ml/hr IV continuous infusion.

## 2022-07-20 ENCOUNTER — ANESTHESIA EVENT (OUTPATIENT)
Dept: SURGERY | Age: 77
End: 2022-07-20
Payer: MEDICARE

## 2022-07-20 ENCOUNTER — ANESTHESIA (OUTPATIENT)
Dept: SURGERY | Age: 77
End: 2022-07-20
Payer: MEDICARE

## 2022-07-20 ENCOUNTER — APPOINTMENT (OUTPATIENT)
Dept: GENERAL RADIOLOGY | Age: 77
End: 2022-07-20
Attending: SURGERY
Payer: MEDICARE

## 2022-07-20 ENCOUNTER — HOSPITAL ENCOUNTER (OUTPATIENT)
Age: 77
Discharge: HOME | End: 2022-07-20
Attending: SURGERY | Admitting: SURGERY
Payer: MEDICARE

## 2022-07-20 VITALS
SYSTOLIC BLOOD PRESSURE: 133 MMHG | TEMPERATURE: 97.1 F | DIASTOLIC BLOOD PRESSURE: 88 MMHG | BODY MASS INDEX: 26.87 KG/M2 | RESPIRATION RATE: 18 BRPM | HEART RATE: 71 BPM | HEIGHT: 62 IN | OXYGEN SATURATION: 97 % | WEIGHT: 146 LBS

## 2022-07-20 PROBLEM — C50.919 BREAST CANCER (HCC): Status: ACTIVE | Noted: 2022-07-20

## 2022-07-20 LAB
GLUCOSE BLD STRIP.AUTO-MCNC: 182 MG/DL (ref 65–117)
PERFORMED BY, TECHID: ABNORMAL

## 2022-07-20 PROCEDURE — 74011250636 HC RX REV CODE- 250/636: Performed by: NURSE ANESTHETIST, CERTIFIED REGISTERED

## 2022-07-20 PROCEDURE — 76000 FLUOROSCOPY <1 HR PHYS/QHP: CPT

## 2022-07-20 PROCEDURE — 76010000149 HC OR TIME 1 TO 1.5 HR: Performed by: SURGERY

## 2022-07-20 PROCEDURE — 2709999900 HC NON-CHARGEABLE SUPPLY: Performed by: SURGERY

## 2022-07-20 PROCEDURE — 76210000021 HC REC RM PH II 0.5 TO 1 HR: Performed by: SURGERY

## 2022-07-20 PROCEDURE — 76210000006 HC OR PH I REC 0.5 TO 1 HR: Performed by: SURGERY

## 2022-07-20 PROCEDURE — 77030002933 HC SUT MCRYL J&J -A: Performed by: SURGERY

## 2022-07-20 PROCEDURE — 77030010509 HC AIRWY LMA MSK TELE -A: Performed by: ANESTHESIOLOGY

## 2022-07-20 PROCEDURE — 74011250636 HC RX REV CODE- 250/636: Performed by: SURGERY

## 2022-07-20 PROCEDURE — 77030002986 HC SUT PROL J&J -A: Performed by: SURGERY

## 2022-07-20 PROCEDURE — 77030040361 HC SLV COMPR DVT MDII -B: Performed by: SURGERY

## 2022-07-20 PROCEDURE — 74011000250 HC RX REV CODE- 250: Performed by: NURSE ANESTHETIST, CERTIFIED REGISTERED

## 2022-07-20 PROCEDURE — 71045 X-RAY EXAM CHEST 1 VIEW: CPT

## 2022-07-20 PROCEDURE — 77030010507 HC ADH SKN DERMBND J&J -B: Performed by: SURGERY

## 2022-07-20 PROCEDURE — 82962 GLUCOSE BLOOD TEST: CPT

## 2022-07-20 PROCEDURE — 77030031139 HC SUT VCRL2 J&J -A: Performed by: SURGERY

## 2022-07-20 PROCEDURE — 76060000033 HC ANESTHESIA 1 TO 1.5 HR: Performed by: SURGERY

## 2022-07-20 PROCEDURE — 74011000250 HC RX REV CODE- 250: Performed by: SURGERY

## 2022-07-20 PROCEDURE — C1788 PORT, INDWELLING, IMP: HCPCS | Performed by: SURGERY

## 2022-07-20 DEVICE — PORT INFUS 8FR SIL PWR INJ ATTCH POLYUR CATH INTMED KT: Type: IMPLANTABLE DEVICE | Site: CHEST  WALL | Status: FUNCTIONAL

## 2022-07-20 RX ORDER — HYDROMORPHONE HYDROCHLORIDE 2 MG/1
1 TABLET ORAL
Status: DISCONTINUED | OUTPATIENT
Start: 2022-07-20 | End: 2022-07-20 | Stop reason: HOSPADM

## 2022-07-20 RX ORDER — NORETHINDRONE AND ETHINYL ESTRADIOL 0.5-0.035
KIT ORAL AS NEEDED
Status: DISCONTINUED | OUTPATIENT
Start: 2022-07-20 | End: 2022-07-20

## 2022-07-20 RX ORDER — SODIUM CHLORIDE 0.9 % (FLUSH) 0.9 %
5-40 SYRINGE (ML) INJECTION EVERY 8 HOURS
Status: DISCONTINUED | OUTPATIENT
Start: 2022-07-20 | End: 2022-07-20 | Stop reason: HOSPADM

## 2022-07-20 RX ORDER — MORPHINE SULFATE 15 MG/1
15 TABLET ORAL
Status: DISCONTINUED | OUTPATIENT
Start: 2022-07-20 | End: 2022-07-20 | Stop reason: HOSPADM

## 2022-07-20 RX ORDER — LIDOCAINE HYDROCHLORIDE 10 MG/ML
0.1 INJECTION, SOLUTION EPIDURAL; INFILTRATION; INTRACAUDAL; PERINEURAL AS NEEDED
Status: DISCONTINUED | OUTPATIENT
Start: 2022-07-20 | End: 2022-07-20 | Stop reason: HOSPADM

## 2022-07-20 RX ORDER — METOPROLOL TARTRATE 5 MG/5ML
2.5 INJECTION INTRAVENOUS
Status: DISCONTINUED | OUTPATIENT
Start: 2022-07-20 | End: 2022-07-20 | Stop reason: HOSPADM

## 2022-07-20 RX ORDER — DIPHENHYDRAMINE HYDROCHLORIDE 50 MG/ML
12.5 INJECTION, SOLUTION INTRAMUSCULAR; INTRAVENOUS AS NEEDED
Status: DISCONTINUED | OUTPATIENT
Start: 2022-07-20 | End: 2022-07-20 | Stop reason: HOSPADM

## 2022-07-20 RX ORDER — ONDANSETRON 2 MG/ML
INJECTION INTRAMUSCULAR; INTRAVENOUS AS NEEDED
Status: DISCONTINUED | OUTPATIENT
Start: 2022-07-20 | End: 2022-07-20 | Stop reason: HOSPADM

## 2022-07-20 RX ORDER — FENTANYL CITRATE 50 UG/ML
50 INJECTION, SOLUTION INTRAMUSCULAR; INTRAVENOUS
Status: DISCONTINUED | OUTPATIENT
Start: 2022-07-20 | End: 2022-07-20 | Stop reason: HOSPADM

## 2022-07-20 RX ORDER — SUCCINYLCHOLINE CHLORIDE 20 MG/ML INJECTION SOLUTION
SOLUTION AS NEEDED
Status: DISCONTINUED | OUTPATIENT
Start: 2022-07-20 | End: 2022-07-20 | Stop reason: HOSPADM

## 2022-07-20 RX ORDER — HYDROMORPHONE HYDROCHLORIDE 1 MG/ML
0.5 INJECTION, SOLUTION INTRAMUSCULAR; INTRAVENOUS; SUBCUTANEOUS
Status: DISCONTINUED | OUTPATIENT
Start: 2022-07-20 | End: 2022-07-20 | Stop reason: HOSPADM

## 2022-07-20 RX ORDER — MIDAZOLAM HYDROCHLORIDE 1 MG/ML
1 INJECTION, SOLUTION INTRAMUSCULAR; INTRAVENOUS AS NEEDED
Status: DISCONTINUED | OUTPATIENT
Start: 2022-07-20 | End: 2022-07-20 | Stop reason: HOSPADM

## 2022-07-20 RX ORDER — LISINOPRIL 20 MG/1
20 TABLET ORAL DAILY
COMMUNITY

## 2022-07-20 RX ORDER — PROPOFOL 10 MG/ML
INJECTION, EMULSION INTRAVENOUS AS NEEDED
Status: DISCONTINUED | OUTPATIENT
Start: 2022-07-20 | End: 2022-07-20 | Stop reason: HOSPADM

## 2022-07-20 RX ORDER — SODIUM CHLORIDE, SODIUM LACTATE, POTASSIUM CHLORIDE, CALCIUM CHLORIDE 600; 310; 30; 20 MG/100ML; MG/100ML; MG/100ML; MG/100ML
20 INJECTION, SOLUTION INTRAVENOUS CONTINUOUS
Status: DISCONTINUED | OUTPATIENT
Start: 2022-07-20 | End: 2022-07-20 | Stop reason: HOSPADM

## 2022-07-20 RX ORDER — SODIUM CHLORIDE 0.9 % (FLUSH) 0.9 %
5-40 SYRINGE (ML) INJECTION AS NEEDED
Status: DISCONTINUED | OUTPATIENT
Start: 2022-07-20 | End: 2022-07-20 | Stop reason: HOSPADM

## 2022-07-20 RX ORDER — NORETHINDRONE AND ETHINYL ESTRADIOL 0.5-0.035
KIT ORAL AS NEEDED
Status: DISCONTINUED | OUTPATIENT
Start: 2022-07-20 | End: 2022-07-20 | Stop reason: HOSPADM

## 2022-07-20 RX ORDER — NORETHINDRONE AND ETHINYL ESTRADIOL 0.5-0.035
5 KIT ORAL AS NEEDED
Status: DISCONTINUED | OUTPATIENT
Start: 2022-07-20 | End: 2022-07-20 | Stop reason: HOSPADM

## 2022-07-20 RX ORDER — LIDOCAINE HYDROCHLORIDE 20 MG/ML
INJECTION, SOLUTION EPIDURAL; INFILTRATION; INTRACAUDAL; PERINEURAL AS NEEDED
Status: DISCONTINUED | OUTPATIENT
Start: 2022-07-20 | End: 2022-07-20 | Stop reason: HOSPADM

## 2022-07-20 RX ORDER — SODIUM CHLORIDE, SODIUM LACTATE, POTASSIUM CHLORIDE, CALCIUM CHLORIDE 600; 310; 30; 20 MG/100ML; MG/100ML; MG/100ML; MG/100ML
INJECTION, SOLUTION INTRAVENOUS
Status: DISCONTINUED | OUTPATIENT
Start: 2022-07-20 | End: 2022-07-20 | Stop reason: HOSPADM

## 2022-07-20 RX ORDER — MIDAZOLAM HYDROCHLORIDE 1 MG/ML
0.5 INJECTION, SOLUTION INTRAMUSCULAR; INTRAVENOUS
Status: DISCONTINUED | OUTPATIENT
Start: 2022-07-20 | End: 2022-07-20 | Stop reason: HOSPADM

## 2022-07-20 RX ORDER — FENTANYL CITRATE 50 UG/ML
50 INJECTION, SOLUTION INTRAMUSCULAR; INTRAVENOUS AS NEEDED
Status: DISCONTINUED | OUTPATIENT
Start: 2022-07-20 | End: 2022-07-20 | Stop reason: HOSPADM

## 2022-07-20 RX ORDER — HYDROCODONE BITARTRATE AND ACETAMINOPHEN 5; 325 MG/1; MG/1
1 TABLET ORAL
Status: DISCONTINUED | OUTPATIENT
Start: 2022-07-20 | End: 2022-07-20 | Stop reason: HOSPADM

## 2022-07-20 RX ORDER — OXYCODONE AND ACETAMINOPHEN 5; 325 MG/1; MG/1
1 TABLET ORAL AS NEEDED
Status: DISCONTINUED | OUTPATIENT
Start: 2022-07-20 | End: 2022-07-20 | Stop reason: HOSPADM

## 2022-07-20 RX ORDER — CLINDAMYCIN PHOSPHATE 600 MG/50ML
600 INJECTION INTRAVENOUS ONCE
Status: COMPLETED | OUTPATIENT
Start: 2022-07-20 | End: 2022-07-20

## 2022-07-20 RX ORDER — MORPHINE SULFATE 2 MG/ML
2 INJECTION, SOLUTION INTRAMUSCULAR; INTRAVENOUS
Status: DISCONTINUED | OUTPATIENT
Start: 2022-07-20 | End: 2022-07-20 | Stop reason: HOSPADM

## 2022-07-20 RX ORDER — ONDANSETRON 2 MG/ML
4 INJECTION INTRAMUSCULAR; INTRAVENOUS AS NEEDED
Status: DISCONTINUED | OUTPATIENT
Start: 2022-07-20 | End: 2022-07-20 | Stop reason: HOSPADM

## 2022-07-20 RX ORDER — FENTANYL CITRATE 50 UG/ML
INJECTION, SOLUTION INTRAMUSCULAR; INTRAVENOUS AS NEEDED
Status: DISCONTINUED | OUTPATIENT
Start: 2022-07-20 | End: 2022-07-20 | Stop reason: HOSPADM

## 2022-07-20 RX ORDER — BUPIVACAINE HYDROCHLORIDE 2.5 MG/ML
INJECTION, SOLUTION EPIDURAL; INFILTRATION; INTRACAUDAL AS NEEDED
Status: DISCONTINUED | OUTPATIENT
Start: 2022-07-20 | End: 2022-07-20 | Stop reason: HOSPADM

## 2022-07-20 RX ORDER — HYDRALAZINE HYDROCHLORIDE 20 MG/ML
10 INJECTION INTRAMUSCULAR; INTRAVENOUS
Status: DISCONTINUED | OUTPATIENT
Start: 2022-07-20 | End: 2022-07-20 | Stop reason: HOSPADM

## 2022-07-20 RX ORDER — LABETALOL HCL 20 MG/4 ML
5 SYRINGE (ML) INTRAVENOUS
Status: DISCONTINUED | OUTPATIENT
Start: 2022-07-20 | End: 2022-07-20 | Stop reason: HOSPADM

## 2022-07-20 RX ORDER — OXYCODONE AND ACETAMINOPHEN 5; 325 MG/1; MG/1
1 TABLET ORAL
Status: DISCONTINUED | OUTPATIENT
Start: 2022-07-20 | End: 2022-07-20 | Stop reason: HOSPADM

## 2022-07-20 RX ORDER — OXYCODONE HYDROCHLORIDE 5 MG/1
5 TABLET ORAL
Status: DISCONTINUED | OUTPATIENT
Start: 2022-07-20 | End: 2022-07-20 | Stop reason: HOSPADM

## 2022-07-20 RX ORDER — DEXAMETHASONE SODIUM PHOSPHATE 4 MG/ML
INJECTION, SOLUTION INTRA-ARTICULAR; INTRALESIONAL; INTRAMUSCULAR; INTRAVENOUS; SOFT TISSUE AS NEEDED
Status: DISCONTINUED | OUTPATIENT
Start: 2022-07-20 | End: 2022-07-20 | Stop reason: HOSPADM

## 2022-07-20 RX ADMIN — PHENYLEPHRINE HYDROCHLORIDE 200 MCG: 10 INJECTION INTRAVENOUS at 08:53

## 2022-07-20 RX ADMIN — ONDANSETRON 4 MG: 2 INJECTION INTRAMUSCULAR; INTRAVENOUS at 08:50

## 2022-07-20 RX ADMIN — PHENYLEPHRINE HYDROCHLORIDE 300 MCG: 10 INJECTION INTRAVENOUS at 08:58

## 2022-07-20 RX ADMIN — SODIUM CHLORIDE, POTASSIUM CHLORIDE, SODIUM LACTATE AND CALCIUM CHLORIDE: 600; 310; 30; 20 INJECTION, SOLUTION INTRAVENOUS at 08:15

## 2022-07-20 RX ADMIN — LIDOCAINE HYDROCHLORIDE 100 MG: 20 INJECTION, SOLUTION EPIDURAL; INFILTRATION; INTRACAUDAL; PERINEURAL at 08:38

## 2022-07-20 RX ADMIN — CLINDAMYCIN PHOSPHATE 600 MG: 600 INJECTION, SOLUTION INTRAVENOUS at 08:40

## 2022-07-20 RX ADMIN — SODIUM CHLORIDE, POTASSIUM CHLORIDE, SODIUM LACTATE AND CALCIUM CHLORIDE 20 ML/HR: 600; 310; 30; 20 INJECTION, SOLUTION INTRAVENOUS at 07:27

## 2022-07-20 RX ADMIN — EPHEDRINE SULFATE 15 MG: 50 INJECTION INTRAVENOUS at 09:02

## 2022-07-20 RX ADMIN — DEXAMETHASONE SODIUM PHOSPHATE 4 MG: 4 INJECTION, SOLUTION INTRA-ARTICULAR; INTRALESIONAL; INTRAMUSCULAR; INTRAVENOUS; SOFT TISSUE at 08:50

## 2022-07-20 RX ADMIN — Medication 40 MG: at 09:04

## 2022-07-20 RX ADMIN — PROPOFOL 150 MG: 10 INJECTION, EMULSION INTRAVENOUS at 08:38

## 2022-07-20 RX ADMIN — PHENYLEPHRINE HYDROCHLORIDE 200 MCG: 10 INJECTION INTRAVENOUS at 09:16

## 2022-07-20 RX ADMIN — EPHEDRINE SULFATE 10 MG: 50 INJECTION INTRAVENOUS at 08:55

## 2022-07-20 RX ADMIN — PHENYLEPHRINE HYDROCHLORIDE 100 MCG: 10 INJECTION INTRAVENOUS at 08:47

## 2022-07-20 RX ADMIN — FENTANYL CITRATE 25 MCG: 0.05 INJECTION, SOLUTION INTRAMUSCULAR; INTRAVENOUS at 08:50

## 2022-07-20 RX ADMIN — PHENYLEPHRINE HYDROCHLORIDE 200 MCG: 10 INJECTION INTRAVENOUS at 08:49

## 2022-07-20 NOTE — PROCEDURES
DATE: 7/20/2022  PRE OP DIAGNOSIS: Breast cancer  POST OP DIAGNOSIS: SAME  PROCEDURE: Right internal jugular vein MEDIPORT PLACEMENT WITH FLUOROSCOPIC AND ULTRASOUND GUIDANCE  SURGEON: Amelia Richmond MD  ANESTHESIA: LOCAL WITH IV SEDATION  EBL: LESS THAN 5 ML  FLUIDS: 500 ML  FINDINGS: TIP AT CAVOATRIAL JUNCTION  COMPLICATIONS: NONE IMMEDIATE  DISPO: TO RECOVERY IN STABLE CONDITION    Indications: Ms. Zoran Navarro is a 65year old woman who presented for mediport placement for treatment of breast cancer. Prior to the procedure the patient expressed understanding of the risks of bleeding, infection, need for further procedures and pneumothorax. Procedure: The patient was taken back to the OR and placed on the operating room table in the supine position. After induction of sedation, the right neck and chest were prepped in the usual sterile fashion. A time out was performed according to standard institutional protocol. All aspects of the port were flushed. The patient was placed in trendelenberg position. Local anesthetic was infiltrated into the right neck. Under ultrasound guidance the right internal jugular vein was accessed and the wire was threaded through using the Seldinger technique. Wire placement was confirmed with fluoroscopy. A nick was made and the tract was dilated. The catheter was threaded and positioned with fluroscopic guidance. Additional anesthetic was placed in the neck and chest. An incision was made and a pocket was created with electrocautery. The catheter was threaded through to the incision and cut to an appropriate length. The catheter was attached to the reservoir and the reservoir was sutured to the chest wall with 2-0 prolene stitches. Final positioning was confirmed with fluorscopy and pictures were taken saved and placed in the patient's chart. 4 ML of 100 units/ML of heparinized saline was used to flush the port.  The incision was closed with 3-0 vicryl stitched in the deep dermis and and the skin was closed with a running 4-0 monocryl stitch placed in a subcuticular fashion. Dermabond was applied. The patient was woken up and transported to the PACU in stable condition. I was present for the entire procedure.

## 2022-07-20 NOTE — DISCHARGE INSTRUCTIONS
You may tylenol as needed for pain. You may shower tomorrow. Follow up with Dr. Therese Loza as scheduled.

## 2022-07-20 NOTE — PROGRESS NOTES
Assumed care of patient from pacu rn. Patient denies pain. Snack and drink given to patient.  Family in room

## 2022-07-20 NOTE — ANESTHESIA PREPROCEDURE EVALUATION
Relevant Problems   PERSONAL HX & FAMILY HX OF CANCER   (+) Breast cancer (HCC)       Anesthetic History   No history of anesthetic complications            Review of Systems / Medical History  Patient summary reviewed, nursing notes reviewed and pertinent labs reviewed    Pulmonary                   Neuro/Psych     seizures         Cardiovascular    Hypertension      CHF             GI/Hepatic/Renal     GERD           Endo/Other    Diabetes  Hypothyroidism  Cancer (CA BREAST) and anemia     Other Findings   Comments: Ch. Back pain / right lumbar radiculopathy. s/p spinal cord stimulator implant.           Past Medical History:   Diagnosis Date    Breast cancer ([facility] [address]     left    Diabetes ([facility] [address]     Heart failure ([facility] [address]     High cholesterol     Hypertension     Hypothyroid     Seizures (HCC)     as a child       Past Surgical History:   Procedure Laterality Date    HX BACK SURGERY      implants placed between vertebrae     HX BREAST BIOPSY Right 2017    benign    HX COLONOSCOPY      HX DILATION AND CURETTAGE      HX HYSTERECTOMY      HX MASTECTOMY N/A 05/04/2022    LEFT PARTIAL MASTECTOMY WITH SENTINEL NODE BIOPSY performed by Emile Lorenzana MD at [facility] IR INSERT/REPLACE SPINE NEUROSTIM GENERATOR  Left 11/19/2018    Pt states stimulator       Current Outpatient Medications   Medication Instructions    acetaminophen (TYLENOL) 650 mg, Oral, EVERY 8 HOURS    aspirin 81 mg, Oral, DAILY    carvediloL (COREG) 25 mg, 2 TIMES DAILY WITH MEALS    cholecalciferol, vitamin D3, 50 mcg (2,000 unit) tab Oral, 2 TIMES DAILY    diphenhydrAMINE (BENADRYL) 50 mg, Oral, BEDTIME PRN    gabapentin (NEURONTIN) 800 mg, Oral, 3 TIMES DAILY    glipiZIDE (GLUCOTROL) 10 mg, 2 TIMES DAILY    latanoprost (XALATAN) 0.005 % ophthalmic solution 1 Drop, Both Eyes, EVERY BEDTIME    levothyroxine sodium (LEVOTHYROXINE PO) 0.005 mg, Oral, DAILY BEFORE BREAKFAST    lisinopriL (PRINIVIL, ZESTRIL) 20 mg, Oral, DAILY    metFORMIN (GLUCOPHAGE) 500 mg, Oral, 2 TIMES DAILY WITH MEALS    rosuvastatin (CRESTOR) 10 mg, EVERY BEDTIME    spironolactone (ALDACTONE) 25 mg, DAILY       Current Facility-Administered Medications   Medication Dose Route Frequency    clindamycin (CLEOCIN) 600mg D5W 50mL IVPB (premix)  600 mg IntraVENous ONCE    lactated Ringers infusion  20 mL/hr IntraVENous CONTINUOUS       Patient Vitals for the past 24 hrs:   Temp Pulse Resp BP SpO2   07/20/22 0656 36.6 °C (97.8 °F) 64 18 (!) 115/55 97 %       Lab Results   Component Value Date/Time    WBC 4.2 06/13/2022 08:20 AM    HGB 11.4 (L) 06/13/2022 08:20 AM    HCT 35.7 06/13/2022 08:20 AM    PLATELET 865 (L) 29/26/5171 08:20 AM    MCV 86.0 06/13/2022 08:20 AM     Lab Results   Component Value Date/Time    Sodium 143 06/13/2022 08:20 AM    Potassium 4.3 06/13/2022 08:20 AM    Chloride 112 (H) 06/13/2022 08:20 AM    CO2 26 06/13/2022 08:20 AM    Anion gap 5 06/13/2022 08:20 AM    Glucose 199 (H) 06/13/2022 08:20 AM    BUN 7 06/13/2022 08:20 AM    Creatinine 0.76 06/13/2022 08:20 AM    BUN/Creatinine ratio 9 (L) 06/13/2022 08:20 AM    GFR est AA >60 06/13/2022 08:20 AM    GFR est non-AA >60 06/13/2022 08:20 AM    Calcium 9.1 06/13/2022 08:20 AM     No results found for: APTT, PTP, INR, INREXT  Lab Results   Component Value Date/Time    Glucose 199 (H) 06/13/2022 08:20 AM    Glucose (POC) 182 (H) 07/20/2022 07:22 AM     Physical Exam    Airway  Mallampati: II  TM Distance: 4 - 6 cm  Neck ROM: normal range of motion   Mouth opening: Normal     Cardiovascular    Rhythm: regular  Rate: normal         Dental    Dentition: Poor dentition     Pulmonary  Breath sounds clear to auscultation               Abdominal  GI exam deferred       Other Findings            Anesthetic Plan    ASA: 3  Anesthesia type: total IV anesthesia and MAC          Induction: Intravenous  Anesthetic plan and risks discussed with: Patient and Family

## 2022-07-22 NOTE — ANESTHESIA POSTPROCEDURE EVALUATION
Procedure(s):  Right internal jugular vein MEDIPORT PLACEMENT WITH FLUOROSCOPIC AND ULTRASOUND GUIDANCE.    total IV anesthesia, MAC    Anesthesia Post Evaluation      Multimodal analgesia: multimodal analgesia used between 6 hours prior to anesthesia start to PACU discharge  Patient location during evaluation: PACU  Patient participation: complete - patient participated  Level of consciousness: awake  Pain score: 0  Pain management: adequate  Airway patency: patent  Anesthetic complications: no  Cardiovascular status: acceptable  Respiratory status: acceptable  Hydration status: acceptable  Post anesthesia nausea and vomiting:  controlled  Final Post Anesthesia Temperature Assessment:  Normothermia (36.0-37.5 degrees C)      INITIAL Post-op Vital signs:   Vitals Value Taken Time   /74 07/20/22 1023   Temp 36.2 °C (97.1 °F) 07/20/22 1015   Pulse 73 07/20/22 1023   Resp 18 07/20/22 1023   SpO2 96 % 07/20/22 1023

## 2022-09-18 ENCOUNTER — APPOINTMENT (OUTPATIENT)
Dept: CT IMAGING | Age: 77
End: 2022-09-18
Attending: EMERGENCY MEDICINE
Payer: MEDICARE

## 2022-09-18 ENCOUNTER — APPOINTMENT (OUTPATIENT)
Dept: GENERAL RADIOLOGY | Age: 77
End: 2022-09-18
Attending: EMERGENCY MEDICINE
Payer: MEDICARE

## 2022-09-18 ENCOUNTER — HOSPITAL ENCOUNTER (EMERGENCY)
Age: 77
Discharge: HOME | End: 2022-09-18
Attending: EMERGENCY MEDICINE
Payer: MEDICARE

## 2022-09-18 VITALS
DIASTOLIC BLOOD PRESSURE: 55 MMHG | HEIGHT: 62 IN | WEIGHT: 142 LBS | SYSTOLIC BLOOD PRESSURE: 91 MMHG | RESPIRATION RATE: 18 BRPM | BODY MASS INDEX: 26.13 KG/M2 | OXYGEN SATURATION: 100 % | HEART RATE: 74 BPM | TEMPERATURE: 98.1 F

## 2022-09-18 DIAGNOSIS — E16.2 HYPOGLYCEMIA: Primary | ICD-10-CM

## 2022-09-18 LAB
ALBUMIN SERPL-MCNC: 3.4 G/DL (ref 3.5–5)
ALBUMIN/GLOB SERPL: 1 {RATIO} (ref 1.1–2.2)
ALP SERPL-CCNC: 88 U/L (ref 45–117)
ALT SERPL-CCNC: 19 U/L (ref 12–78)
ANION GAP SERPL CALC-SCNC: 8 MMOL/L (ref 5–15)
APPEARANCE UR: CLEAR
AST SERPL W P-5'-P-CCNC: 16 U/L (ref 15–37)
ATRIAL RATE: 70 BPM
BASOPHILS # BLD: 0 K/UL (ref 0–0.1)
BASOPHILS NFR BLD: 0 % (ref 0–1)
BILIRUB SERPL-MCNC: 0.2 MG/DL (ref 0.2–1)
BILIRUB UR QL: NEGATIVE
BNP SERPL-MCNC: 186 PG/ML
BUN SERPL-MCNC: 10 MG/DL (ref 6–20)
BUN/CREAT SERPL: 10 (ref 12–20)
CA-I BLD-MCNC: 9.4 MG/DL (ref 8.5–10.1)
CALCULATED P AXIS, ECG09: 48 DEGREES
CALCULATED R AXIS, ECG10: -9 DEGREES
CALCULATED T AXIS, ECG11: 80 DEGREES
CHLORIDE SERPL-SCNC: 109 MMOL/L (ref 97–108)
CO2 SERPL-SCNC: 26 MMOL/L (ref 21–32)
COLOR UR: ABNORMAL
CREAT SERPL-MCNC: 1.03 MG/DL (ref 0.55–1.02)
DIAGNOSIS, 93000: NORMAL
DIFFERENTIAL METHOD BLD: ABNORMAL
EOSINOPHIL # BLD: 0.2 K/UL (ref 0–0.4)
EOSINOPHIL NFR BLD: 1 % (ref 0–7)
ERYTHROCYTE [DISTWIDTH] IN BLOOD BY AUTOMATED COUNT: 15.4 % (ref 11.5–14.5)
GLOBULIN SER CALC-MCNC: 3.3 G/DL (ref 2–4)
GLUCOSE BLD STRIP.AUTO-MCNC: 73 MG/DL (ref 65–100)
GLUCOSE SERPL-MCNC: 56 MG/DL (ref 65–100)
GLUCOSE UR STRIP.AUTO-MCNC: NEGATIVE MG/DL
HCT VFR BLD AUTO: 31 % (ref 35–47)
HGB BLD-MCNC: 9.8 G/DL (ref 11.5–16)
HGB UR QL STRIP: NEGATIVE
IMM GRANULOCYTES # BLD AUTO: 0 K/UL
IMM GRANULOCYTES NFR BLD AUTO: 0 %
KETONES UR QL STRIP.AUTO: NEGATIVE MG/DL
LEUKOCYTE ESTERASE UR QL STRIP.AUTO: NEGATIVE
LYMPHOCYTES # BLD: 1.7 K/UL (ref 0.8–3.5)
LYMPHOCYTES NFR BLD: 11 % (ref 12–49)
MAGNESIUM SERPL-MCNC: 1.3 MG/DL (ref 1.6–2.4)
MCH RBC QN AUTO: 27.6 PG (ref 26–34)
MCHC RBC AUTO-ENTMCNC: 31.6 G/DL (ref 30–36.5)
MCV RBC AUTO: 87.3 FL (ref 80–99)
MONOCYTES # BLD: 0.6 K/UL (ref 0–1)
MONOCYTES NFR BLD: 4 % (ref 5–13)
NEUTS SEG # BLD: 12.5 K/UL (ref 1.8–8)
NEUTS SEG NFR BLD: 82 % (ref 32–75)
NITRITE UR QL STRIP.AUTO: NEGATIVE
NRBC # BLD: 0.16 K/UL (ref 0–0.01)
NRBC BLD-RTO: 1 PER 100 WBC
P-R INTERVAL, ECG05: 180 MS
PERFORMED BY, TECHID: NORMAL
PH UR STRIP: 5 [PH]
PLATELET # BLD AUTO: 194 K/UL (ref 150–400)
PMV BLD AUTO: 9.7 FL (ref 8.9–12.9)
POTASSIUM SERPL-SCNC: 3.2 MMOL/L (ref 3.5–5.1)
PROMYELOCYTES NFR BLD MANUAL: 2 %
PROT SERPL-MCNC: 6.7 G/DL (ref 6.4–8.2)
PROT UR STRIP-MCNC: NEGATIVE MG/DL
Q-T INTERVAL, ECG07: 402 MS
QRS DURATION, ECG06: 82 MS
QTC CALCULATION (BEZET), ECG08: 434 MS
RBC # BLD AUTO: 3.55 M/UL (ref 3.8–5.2)
RBC MORPH BLD: ABNORMAL
SODIUM SERPL-SCNC: 143 MMOL/L (ref 136–145)
SP GR UR REFRACTOMETRY: 1.01 (ref 1–1.03)
TSH SERPL DL<=0.05 MIU/L-ACNC: 1.83 UIU/ML (ref 0.36–3.74)
UROBILINOGEN UR QL STRIP.AUTO: 0.1 EU/DL (ref 0.2–1)
VENTRICULAR RATE, ECG03: 70 BPM
WBC # BLD AUTO: 15.3 K/UL (ref 3.6–11)

## 2022-09-18 PROCEDURE — 70450 CT HEAD/BRAIN W/O DYE: CPT

## 2022-09-18 PROCEDURE — 81003 URINALYSIS AUTO W/O SCOPE: CPT

## 2022-09-18 PROCEDURE — 36415 COLL VENOUS BLD VENIPUNCTURE: CPT

## 2022-09-18 PROCEDURE — 99285 EMERGENCY DEPT VISIT HI MDM: CPT

## 2022-09-18 PROCEDURE — 82962 GLUCOSE BLOOD TEST: CPT

## 2022-09-18 PROCEDURE — 83880 ASSAY OF NATRIURETIC PEPTIDE: CPT

## 2022-09-18 PROCEDURE — 96365 THER/PROPH/DIAG IV INF INIT: CPT

## 2022-09-18 PROCEDURE — 84443 ASSAY THYROID STIM HORMONE: CPT

## 2022-09-18 PROCEDURE — 93005 ELECTROCARDIOGRAM TRACING: CPT

## 2022-09-18 PROCEDURE — 74011250637 HC RX REV CODE- 250/637: Performed by: EMERGENCY MEDICINE

## 2022-09-18 PROCEDURE — 85025 COMPLETE CBC W/AUTO DIFF WBC: CPT

## 2022-09-18 PROCEDURE — 96366 THER/PROPH/DIAG IV INF ADDON: CPT

## 2022-09-18 PROCEDURE — 80053 COMPREHEN METABOLIC PANEL: CPT

## 2022-09-18 PROCEDURE — 74011250636 HC RX REV CODE- 250/636: Performed by: EMERGENCY MEDICINE

## 2022-09-18 PROCEDURE — 71045 X-RAY EXAM CHEST 1 VIEW: CPT

## 2022-09-18 PROCEDURE — 83735 ASSAY OF MAGNESIUM: CPT

## 2022-09-18 RX ORDER — MAGNESIUM SULFATE HEPTAHYDRATE 40 MG/ML
2 INJECTION, SOLUTION INTRAVENOUS
Status: COMPLETED | OUTPATIENT
Start: 2022-09-18 | End: 2022-09-18

## 2022-09-18 RX ORDER — POTASSIUM CHLORIDE 750 MG/1
40 TABLET, FILM COATED, EXTENDED RELEASE ORAL
Status: COMPLETED | OUTPATIENT
Start: 2022-09-18 | End: 2022-09-18

## 2022-09-18 RX ADMIN — SODIUM CHLORIDE 1000 ML: 9 INJECTION, SOLUTION INTRAVENOUS at 10:15

## 2022-09-18 RX ADMIN — POTASSIUM CHLORIDE 40 MEQ: 750 TABLET, FILM COATED, EXTENDED RELEASE ORAL at 11:13

## 2022-09-18 RX ADMIN — MAGNESIUM SULFATE HEPTAHYDRATE 2 G: 40 INJECTION, SOLUTION INTRAVENOUS at 11:14

## 2022-09-18 NOTE — ED TRIAGE NOTES
Patient called ems last night . Was symptomatic low sugar. Advised to eat. Same episode this morning. States she was low, ate and now it is going down again.  Feels off

## 2022-09-18 NOTE — Clinical Note
[facility] EMERGENCY DEPT  [address] [place] [id number]  [phone]    Work/School Note    Date: 9/18/2022    To Whom It May concern:      Emili Davis was seen and treated today in the emergency room by the following provider(s):  Attending Provider: Gavin Gold MD.      Emili Davis is excused from work/school on 09/18/22. She is clear to return to work/school on 09/19/22.         Sincerely,          Nishi Jones MD
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
## 2022-09-18 NOTE — DISCHARGE INSTRUCTIONS
Thank you! Thank you for allowing me to care for you in the emergency department. It is my goal to provide you with excellent care. If you have not received excellent quality care, please ask to speak to the nurse manager. Please fill out the survey that will come to you by mail or email since we listen to your feedback! Below you will find a list of your tests from today's visit. Should you have any questions, please do not hesitate to call the emergency department. Labs  Recent Results (from the past 12 hour(s))   GLUCOSE, POC    Collection Time: 09/18/22  9:32 AM   Result Value Ref Range    Glucose (POC) 73 65 - 100 mg/dL    Performed by [facility]    CBC WITH AUTOMATED DIFF    Collection Time: 09/18/22  9:59 AM   Result Value Ref Range    WBC 15.3 (H) 3.6 - 11.0 K/uL    RBC 3.55 (L) 3.80 - 5.20 M/uL    HGB 9.8 (L) 11.5 - 16.0 g/dL    HCT 31.0 (L) 35.0 - 47.0 %    MCV 87.3 80.0 - 99.0 FL    MCH 27.6 26.0 - 34.0 PG    MCHC 31.6 30.0 - 36.5 g/dL    RDW 15.4 (H) 11.5 - 14.5 %    PLATELET 948 681 - 178 K/uL    MPV 9.7 8.9 - 12.9 FL    NRBC 1.0 (H) 0.0  WBC    ABSOLUTE NRBC 0.16 (H) 0.00 - 0.01 K/uL    NEUTROPHILS 82 (H) 32 - 75 %    LYMPHOCYTES 11 (L) 12 - 49 %    MONOCYTES 4 (L) 5 - 13 %    EOSINOPHILS 1 0 - 7 %    BASOPHILS 0 0 - 1 %    PROMYELOCYTES 2 (H) 0 %    IMMATURE GRANULOCYTES 0 %    ABS. NEUTROPHILS 12.5 (H) 1.8 - 8.0 K/UL    ABS. LYMPHOCYTES 1.7 0.8 - 3.5 K/UL    ABS. MONOCYTES 0.6 0.0 - 1.0 K/UL    ABS. EOSINOPHILS 0.2 0.0 - 0.4 K/UL    ABS. BASOPHILS 0.0 0.0 - 0.1 K/UL    ABS. IMM.  GRANS. 0.0 K/UL    DF Manual      RBC COMMENTS Microcytosis  1+       METABOLIC PANEL, COMPREHENSIVE    Collection Time: 09/18/22  9:59 AM   Result Value Ref Range    Sodium 143 136 - 145 mmol/L    Potassium 3.2 (L) 3.5 - 5.1 mmol/L    Chloride 109 (H) 97 - 108 mmol/L    CO2 26 21 - 32 mmol/L    Anion gap 8 5 - 15 mmol/L    Glucose 56 (L) 65 - 100 mg/dL    BUN 10 6 - 20 mg/dL    Creatinine 1.03 (H) 0.55 - 1.02 mg/dL    BUN/Creatinine ratio 10 (L) 12 - 20      GFR est AA >60 >60 ml/min/1.73m2    GFR est non-AA 52 (L) >60 ml/min/1.73m2    Calcium 9.4 8.5 - 10.1 mg/dL    Bilirubin, total 0.2 0.2 - 1.0 mg/dL    AST (SGOT) 16 15 - 37 U/L    ALT (SGPT) 19 12 - 78 U/L    Alk. phosphatase 88 45 - 117 U/L    Protein, total 6.7 6.4 - 8.2 g/dL    Albumin 3.4 (L) 3.5 - 5.0 g/dL    Globulin 3.3 2.0 - 4.0 g/dL    A-G Ratio 1.0 (L) 1.1 - 2.2     MAGNESIUM    Collection Time: 09/18/22  9:59 AM   Result Value Ref Range    Magnesium 1.3 (L) 1.6 - 2.4 mg/dL   NT-PRO BNP    Collection Time: 09/18/22  9:59 AM   Result Value Ref Range    NT pro- <450 pg/mL   TSH 3RD GENERATION    Collection Time: 09/18/22  9:59 AM   Result Value Ref Range    TSH 1.83 0.36 - 3.74 uIU/mL   URINALYSIS W/ RFLX MICROSCOPIC    Collection Time: 09/18/22 11:19 AM   Result Value Ref Range    Color Yellow/Straw      Appearance Clear Clear      Specific gravity 1.015 1.003 - 1.030      pH (UA) 5.0      Protein Negative Negative mg/dL    Glucose Negative Negative mg/dL    Ketone Negative Negative mg/dL    Bilirubin Negative Negative      Blood Negative Negative      Urobilinogen 0.1 (L) 0.2 - 1.0 EU/dL    Nitrites Negative Negative      Leukocyte Esterase Negative Negative         Radiologic Studies  CT HEAD WO CONT   Final Result   1. No evidence of acute intracranial abnormality. XR CHEST PORT   Final Result   No acute cardiopulmonary process. CT Results  (Last 48 hours)                 09/18/22 1045  CT HEAD WO CONT Final result    Impression:  1. No evidence of acute intracranial abnormality. Narrative:  EXAM:  CT HEAD WO CONT       INDICATION:   TIA       COMPARISON: 9/22/2010 head CT. TECHNIQUE: Unenhanced CT of the head was performed using 5 mm images. Brain and   bone windows were generated.   CT dose reduction was achieved through use of a   standardized protocol tailored for this examination and automatic exposure control for dose modulation. FINDINGS:   The ventricles sizes and configuration are within normal limits. Patchy   periventricular and deep white matter ill-defined hypodensities, nonspecific and   likely microangiopathic white matter disease. Basilar cisterns are patent. No   midline shift. There is no evidence of acute infarct, hemorrhage, or extraaxial   fluid collection. The paranasal sinuses, mastoid air cells, and middle ears are clear. Status post bilateral lense replacement. The orbits are otherwise unremarkable. There are no significant osseous or extracranial soft tissue lesions. CXR Results  (Last 48 hours)                 09/18/22 1005  XR CHEST PORT Final result    Impression:  No acute cardiopulmonary process. Narrative:  EXAM:  XR CHEST PORT       INDICATION:   ams       COMPARISON: Chest radiograph 7/20/2022. FINDINGS: AP radiograph of the chest was obtained. Stable positioning of right chest port with tip terminating at the cavoatrial   junction. Spinal stimulating leads again noted terminating in the thoracic   spine. No evidence of focal consolidation. No pleural effusion or pneumothorax. Heart size is normal. Calcifications of the thoracic aorta. No acute osseous   abnormalities. Cholecystectomy clips. Surgical clips in the left axilla.                 ------------------------------------------------------------------------------------------------------------  The exam and treatment you received in the Emergency Department were for an urgent problem and are not intended as complete care. It is important that you follow-up with a doctor, nurse practitioner, or physician assistant to:  (1) confirm your diagnosis,  (2) re-evaluation of changes in your illness and treatment, and  (3) for ongoing care. Please take your discharge instructions with you when you go to your follow-up appointment.      If you have any problem arranging a follow-up appointment, contact the Emergency Department. If your symptoms become worse or you do not improve as expected and you are unable to reach your health care provider, please return to the Emergency Department. We are available 24 hours a day. If a prescription has been provided, please have it filled as soon as possible to prevent a delay in treatment. If you have any questions or reservations about taking the medication due to side effects or interactions with other medications, please call your primary care provider or contact the ER.
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
## 2022-09-20 ENCOUNTER — HOSPITAL ENCOUNTER (OUTPATIENT)
Age: 77
Setting detail: OBSERVATION
Discharge: HOME HEALTH | End: 2022-09-22
Attending: STUDENT IN AN ORGANIZED HEALTH CARE EDUCATION/TRAINING PROGRAM | Admitting: INTERNAL MEDICINE
Payer: MEDICARE

## 2022-09-20 ENCOUNTER — APPOINTMENT (OUTPATIENT)
Dept: NON INVASIVE DIAGNOSTICS | Age: 77
End: 2022-09-20
Attending: INTERNAL MEDICINE
Payer: MEDICARE

## 2022-09-20 ENCOUNTER — APPOINTMENT (OUTPATIENT)
Dept: CT IMAGING | Age: 77
End: 2022-09-20
Attending: STUDENT IN AN ORGANIZED HEALTH CARE EDUCATION/TRAINING PROGRAM
Payer: MEDICARE

## 2022-09-20 DIAGNOSIS — R52 PAIN: ICD-10-CM

## 2022-09-20 DIAGNOSIS — G45.9 TIA (TRANSIENT ISCHEMIC ATTACK): Primary | ICD-10-CM
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
## 2022-09-20 LAB
ANION GAP SERPL CALC-SCNC: 5 MMOL/L (ref 5–15)
APPEARANCE UR: CLEAR
ATRIAL RATE: 62 BPM
BACTERIA URNS QL MICRO: NEGATIVE /HPF
BASOPHILS # BLD: 0.1 K/UL (ref 0–0.1)
BASOPHILS NFR BLD: 1 % (ref 0–1)
BILIRUB UR QL: NEGATIVE
BUN SERPL-MCNC: 5 MG/DL (ref 6–20)
BUN/CREAT SERPL: 6 (ref 12–20)
CA-I BLD-MCNC: 9 MG/DL (ref 8.5–10.1)
CALCULATED P AXIS, ECG09: 71 DEGREES
CALCULATED R AXIS, ECG10: 3 DEGREES
CALCULATED T AXIS, ECG11: 63 DEGREES
CHLORIDE SERPL-SCNC: 114 MMOL/L (ref 97–108)
CO2 SERPL-SCNC: 24 MMOL/L (ref 21–32)
COLOR UR: YELLOW
CREAT SERPL-MCNC: 0.81 MG/DL (ref 0.55–1.02)
DIAGNOSIS, 93000: NORMAL
DIFFERENTIAL METHOD BLD: ABNORMAL
EOSINOPHIL # BLD: 0 K/UL (ref 0–0.4)
EOSINOPHIL NFR BLD: 0 % (ref 0–7)
ERYTHROCYTE [DISTWIDTH] IN BLOOD BY AUTOMATED COUNT: 15.9 % (ref 11.5–14.5)
GLUCOSE BLD STRIP.AUTO-MCNC: 101 MG/DL (ref 65–100)
GLUCOSE BLD STRIP.AUTO-MCNC: 142 MG/DL (ref 65–100)
GLUCOSE BLD STRIP.AUTO-MCNC: 76 MG/DL (ref 65–100)
GLUCOSE BLD STRIP.AUTO-MCNC: 86 MG/DL (ref 65–100)
GLUCOSE SERPL-MCNC: 117 MG/DL (ref 65–100)
GLUCOSE UR STRIP.AUTO-MCNC: NEGATIVE MG/DL
HCT VFR BLD AUTO: 31.9 % (ref 35–47)
HGB BLD-MCNC: 10 G/DL (ref 11.5–16)
HGB UR QL STRIP: NEGATIVE
IMM GRANULOCYTES # BLD AUTO: 0 K/UL
IMM GRANULOCYTES NFR BLD AUTO: 0 %
KETONES UR QL STRIP.AUTO: NEGATIVE MG/DL
LEUKOCYTE ESTERASE UR QL STRIP.AUTO: NEGATIVE
LYMPHOCYTES # BLD: 2.3 K/UL (ref 0.8–3.5)
LYMPHOCYTES NFR BLD: 16 % (ref 12–49)
MCH RBC QN AUTO: 27.8 PG (ref 26–34)
MCHC RBC AUTO-ENTMCNC: 31.3 G/DL (ref 30–36.5)
MCV RBC AUTO: 88.6 FL (ref 80–99)
METAMYELOCYTES NFR BLD MANUAL: 1 %
MONOCYTES # BLD: 0.9 K/UL (ref 0–1)
MONOCYTES NFR BLD: 6 % (ref 5–13)
MUCOUS THREADS URNS QL MICRO: NORMAL /LPF
MYELOCYTES NFR BLD MANUAL: 3 %
NEUTS SEG # BLD: 9.9 K/UL (ref 1.8–8)
NEUTS SEG NFR BLD: 70 % (ref 32–75)
NITRITE UR QL STRIP.AUTO: NEGATIVE
NRBC # BLD: 0.17 K/UL (ref 0–0.01)
NRBC BLD-RTO: 1.2 PER 100 WBC
OTHER CELLS NFR BLD MANUAL: 3 %
P-R INTERVAL, ECG05: 154 MS
PERFORMED BY, TECHID: ABNORMAL
PERFORMED BY, TECHID: ABNORMAL
PERFORMED BY, TECHID: NORMAL
PERFORMED BY, TECHID: NORMAL
PH UR STRIP: 5 [PH] (ref 5–8)
PLATELET # BLD AUTO: 183 K/UL (ref 150–400)
PMV BLD AUTO: 10.3 FL (ref 8.9–12.9)
POTASSIUM SERPL-SCNC: 4 MMOL/L (ref 3.5–5.1)
PROT UR STRIP-MCNC: NEGATIVE MG/DL
Q-T INTERVAL, ECG07: 402 MS
QRS DURATION, ECG06: 82 MS
QTC CALCULATION (BEZET), ECG08: 408 MS
RBC # BLD AUTO: 3.6 M/UL (ref 3.8–5.2)
RBC #/AREA URNS HPF: NORMAL /HPF (ref 0–5)
RBC MORPH BLD: ABNORMAL
SODIUM SERPL-SCNC: 143 MMOL/L (ref 136–145)
SP GR UR REFRACTOMETRY: 1.02 (ref 1–1.03)
TROPONIN-HIGH SENSITIVITY: 19 NG/L (ref 0–51)
UA: UC IF INDICATED,UAUC: NORMAL
URATE CRY URNS QL MICRO: NORMAL
UROBILINOGEN UR QL STRIP.AUTO: NEGATIVE EU/DL (ref 0.1–1)
VENTRICULAR RATE, ECG03: 62 BPM
WBC # BLD AUTO: 14.2 K/UL (ref 3.6–11)
WBC URNS QL MICRO: NORMAL /HPF (ref 0–4)

## 2022-09-20 PROCEDURE — 85025 COMPLETE CBC W/AUTO DIFF WBC: CPT

## 2022-09-20 PROCEDURE — G0378 HOSPITAL OBSERVATION PER HR: HCPCS

## 2022-09-20 PROCEDURE — 97116 GAIT TRAINING THERAPY: CPT

## 2022-09-20 PROCEDURE — 93005 ELECTROCARDIOGRAM TRACING: CPT

## 2022-09-20 PROCEDURE — 97161 PT EVAL LOW COMPLEX 20 MIN: CPT

## 2022-09-20 PROCEDURE — 95816 EEG AWAKE AND DROWSY: CPT | Performed by: PSYCHIATRY & NEUROLOGY

## 2022-09-20 PROCEDURE — 81001 URINALYSIS AUTO W/SCOPE: CPT

## 2022-09-20 PROCEDURE — 93306 TTE W/DOPPLER COMPLETE: CPT

## 2022-09-20 PROCEDURE — 70450 CT HEAD/BRAIN W/O DYE: CPT

## 2022-09-20 PROCEDURE — 96372 THER/PROPH/DIAG INJ SC/IM: CPT

## 2022-09-20 PROCEDURE — 74011250637 HC RX REV CODE- 250/637: Performed by: INTERNAL MEDICINE

## 2022-09-20 PROCEDURE — 84484 ASSAY OF TROPONIN QUANT: CPT

## 2022-09-20 PROCEDURE — 93880 EXTRACRANIAL BILAT STUDY: CPT

## 2022-09-20 PROCEDURE — 82962 GLUCOSE BLOOD TEST: CPT

## 2022-09-20 PROCEDURE — 92526 ORAL FUNCTION THERAPY: CPT

## 2022-09-20 PROCEDURE — 80048 BASIC METABOLIC PNL TOTAL CA: CPT

## 2022-09-20 PROCEDURE — 99285 EMERGENCY DEPT VISIT HI MDM: CPT

## 2022-09-20 PROCEDURE — 36415 COLL VENOUS BLD VENIPUNCTURE: CPT

## 2022-09-20 PROCEDURE — 92610 EVALUATE SWALLOWING FUNCTION: CPT

## 2022-09-20 PROCEDURE — 74011000250 HC RX REV CODE- 250: Performed by: INTERNAL MEDICINE

## 2022-09-20 RX ORDER — SODIUM CHLORIDE 9 MG/ML
75 INJECTION, SOLUTION INTRAVENOUS CONTINUOUS
Status: DISPENSED | OUTPATIENT
Start: 2022-09-20 | End: 2022-09-21

## 2022-09-20 RX ORDER — DEXTROMETHORPHAN HYDROBROMIDE, GUAIFENESIN 5; 100 MG/5ML; MG/5ML
650 LIQUID ORAL EVERY 8 HOURS
Status: DISCONTINUED | OUTPATIENT
Start: 2022-09-20 | End: 2022-09-22 | Stop reason: HOSPADM

## 2022-09-20 RX ORDER — ROSUVASTATIN CALCIUM 5 MG/1
10 TABLET, COATED ORAL
Status: DISCONTINUED | OUTPATIENT
Start: 2022-09-20 | End: 2022-09-20 | Stop reason: CLARIF

## 2022-09-20 RX ORDER — LEVOTHYROXINE SODIUM 25 UG/1
50 TABLET ORAL
Status: DISCONTINUED | OUTPATIENT
Start: 2022-09-21 | End: 2022-09-22 | Stop reason: HOSPADM

## 2022-09-20 RX ORDER — ACETAMINOPHEN 325 MG/1
650 TABLET ORAL
Status: DISCONTINUED | OUTPATIENT
Start: 2022-09-20 | End: 2022-09-22 | Stop reason: HOSPADM

## 2022-09-20 RX ORDER — SPIRONOLACTONE 25 MG/1
25 TABLET ORAL DAILY
Status: DISCONTINUED | OUTPATIENT
Start: 2022-09-20 | End: 2022-09-20

## 2022-09-20 RX ORDER — ENOXAPARIN SODIUM 100 MG/ML
40 INJECTION SUBCUTANEOUS EVERY 24 HOURS
Status: DISCONTINUED | OUTPATIENT
Start: 2022-09-20 | End: 2022-09-22 | Stop reason: HOSPADM

## 2022-09-20 RX ORDER — FAMOTIDINE 20 MG/1
20 TABLET, FILM COATED ORAL EVERY 12 HOURS
Status: DISCONTINUED | OUTPATIENT
Start: 2022-09-20 | End: 2022-09-22 | Stop reason: HOSPADM

## 2022-09-20 RX ORDER — TIZANIDINE 4 MG/1
4 TABLET ORAL
COMMUNITY
End: 2022-09-22

## 2022-09-20 RX ORDER — SULFAMETHOXAZOLE AND TRIMETHOPRIM 800; 160 MG/1; MG/1
1 TABLET ORAL EVERY 12 HOURS
Status: DISCONTINUED | OUTPATIENT
Start: 2022-09-20 | End: 2022-09-22 | Stop reason: HOSPADM

## 2022-09-20 RX ORDER — LATANOPROST 50 UG/ML
1 SOLUTION/ DROPS OPHTHALMIC
Status: DISCONTINUED | OUTPATIENT
Start: 2022-09-20 | End: 2022-09-22 | Stop reason: HOSPADM

## 2022-09-20 RX ORDER — ACETAMINOPHEN 650 MG/1
650 SUPPOSITORY RECTAL
Status: DISCONTINUED | OUTPATIENT
Start: 2022-09-20 | End: 2022-09-22 | Stop reason: HOSPADM

## 2022-09-20 RX ORDER — DIPHENHYDRAMINE HCL 25 MG
50 CAPSULE ORAL
Status: DISCONTINUED | OUTPATIENT
Start: 2022-09-20 | End: 2022-09-22 | Stop reason: HOSPADM

## 2022-09-20 RX ORDER — ATORVASTATIN CALCIUM 20 MG/1
20 TABLET, FILM COATED ORAL
Status: DISCONTINUED | OUTPATIENT
Start: 2022-09-20 | End: 2022-09-22 | Stop reason: HOSPADM

## 2022-09-20 RX ORDER — LISINOPRIL 10 MG/1
20 TABLET ORAL DAILY
Status: DISCONTINUED | OUTPATIENT
Start: 2022-09-20 | End: 2022-09-20

## 2022-09-20 RX ORDER — GABAPENTIN 300 MG/1
300 CAPSULE ORAL 3 TIMES DAILY
Status: DISCONTINUED | OUTPATIENT
Start: 2022-09-20 | End: 2022-09-22 | Stop reason: HOSPADM

## 2022-09-20 RX ORDER — CLOPIDOGREL BISULFATE 75 MG/1
75 TABLET ORAL DAILY
Status: DISCONTINUED | OUTPATIENT
Start: 2022-09-20 | End: 2022-09-22 | Stop reason: HOSPADM

## 2022-09-20 RX ORDER — GLIPIZIDE 5 MG/1
10 TABLET ORAL 2 TIMES DAILY
Status: DISCONTINUED | OUTPATIENT
Start: 2022-09-20 | End: 2022-09-21

## 2022-09-20 RX ORDER — CARVEDILOL 12.5 MG/1
25 TABLET ORAL 2 TIMES DAILY WITH MEALS
Status: DISCONTINUED | OUTPATIENT
Start: 2022-09-20 | End: 2022-09-22 | Stop reason: HOSPADM

## 2022-09-20 RX ORDER — DOCUSATE SODIUM 100 MG/1
100 CAPSULE, LIQUID FILLED ORAL 2 TIMES DAILY
Status: DISCONTINUED | OUTPATIENT
Start: 2022-09-20 | End: 2022-09-22 | Stop reason: HOSPADM

## 2022-09-20 RX ORDER — METFORMIN HYDROCHLORIDE 500 MG/1
500 TABLET ORAL 2 TIMES DAILY WITH MEALS
Status: DISCONTINUED | OUTPATIENT
Start: 2022-09-20 | End: 2022-09-22 | Stop reason: HOSPADM

## 2022-09-20 RX ORDER — MELATONIN
1000 DAILY
Status: DISCONTINUED | OUTPATIENT
Start: 2022-09-20 | End: 2022-09-22 | Stop reason: HOSPADM

## 2022-09-20 RX ORDER — TOPIRAMATE 25 MG/1
25 TABLET ORAL 2 TIMES DAILY WITH MEALS
Status: DISCONTINUED | OUTPATIENT
Start: 2022-09-20 | End: 2022-09-22 | Stop reason: HOSPADM

## 2022-09-20 RX ADMIN — CARVEDILOL 25 MG: 12.5 TABLET, FILM COATED ORAL at 16:05

## 2022-09-20 RX ADMIN — FAMOTIDINE 20 MG: 20 TABLET ORAL at 21:34

## 2022-09-20 RX ADMIN — SULFAMETHOXAZOLE AND TRIMETHOPRIM 1 TABLET: 800; 160 TABLET ORAL at 21:34

## 2022-09-20 RX ADMIN — LATANOPROST 1 DROP: 50 SOLUTION OPHTHALMIC at 22:01

## 2022-09-20 RX ADMIN — GABAPENTIN 300 MG: 300 CAPSULE ORAL at 21:34

## 2022-09-20 RX ADMIN — ATORVASTATIN CALCIUM 20 MG: 20 TABLET, FILM COATED ORAL at 22:00

## 2022-09-20 RX ADMIN — TOPIRAMATE 25 MG: 25 TABLET, FILM COATED ORAL at 18:39

## 2022-09-20 RX ADMIN — GABAPENTIN 300 MG: 300 CAPSULE ORAL at 16:05

## 2022-09-20 RX ADMIN — METFORMIN HYDROCHLORIDE 500 MG: 500 TABLET ORAL at 16:05

## 2022-09-20 RX ADMIN — GLIPIZIDE 10 MG: 5 TABLET ORAL at 21:33

## 2022-09-20 RX ADMIN — ACETAMINOPHEN 650 MG: 650 TABLET, FILM COATED, EXTENDED RELEASE ORAL at 21:34

## 2022-09-20 NOTE — PROGRESS NOTES
Problem: Dysphagia (Adult)  Goal: *Acute Goals and Plan of Care (Insert Text)  Description: Speech Therapy Goals  Initiated 9/20/2022  -Patient stated goal: \"I would like to have a better appetite. \"  -Patient will tolerate soft and bite-sized diet with thin liquids without signs/symptoms of aspiration given no cues within 5 day(s). [ ] Not met  [ ]  MET   [ ] Progressing  [ ] Cresencio Coral  -Patient will demonstrate understanding of swallow safety precautions and aspiration precautions, diet recs with no cues within 5 day(s). [ ] Not met  [ ]  MET   [ ] Progressing  [ ] Discontinue   Outcome: Not Progressing Towards Goal    SPEECH [address] EVALUATION  Patient: Michael Esparza (21 y.o. female)  Date: 9/20/2022  Primary Diagnosis: TIA (transient ischemic attack) [G45.9]       Precautions: Weakness       ASSESSMENT :  Based on the objective data described below, the patient presents with mild oral pharyngeal dysphagia. Patient was alert, oriented x4, and agreeable to evaluation. Patient stated that she wants to have a better appetite, but her chemo is affecting her appetite. SLP spoke with her about a possible appetite stimulant and she was agreeable. SLP spoke with physician and he approved starting an appetite stimulant. Patient has top partials, but not bottom partials. However, many front teeth are present to assist with chewing, except for back molars. Patient consumed applesauce, cookies, and ice water via straw. Oral phase: slow mastication and manipulation of bolus due to missing back molars, but functional and not other deficits noted. Pharyngeal phase: grossly intact; good HLE via digital palpation. No overt s/sx aspiration. SLP discussed various diets and the patient is requesting a chopped diet (soft and bite-sized) because she stated it'll alleviate her having to cut the food.     Patient will benefit from skilled intervention to address the above impairments. Patients rehabilitation potential is considered to be Good     PLAN :  Recommendations and Planned Interventions:  Soft and bite-sized diet with thin liquids. Skilled ST to follow with diet toleration, appetite, and possible diet upgrade. Frequency/Duration: Patient will be followed by speech-language pathology 3 times a week to address goals. Discharge Recommendations: To Be Determined     SUBJECTIVE:   Patient I'd like to try the chopped diet. OBJECTIVE:     CXR Results  (Last 48 hours)      None           CT Results  (Last 48 hours)                 09/20/22 0641  CT HEAD WO CONT Final result    Impression:  No acute intracranial process. Narrative:  EXAM: CT HEAD WO CONT       INDICATION: Fall       COMPARISON: None. CONTRAST: None. TECHNIQUE: Unenhanced CT of the head was performed using 5 mm images. Brain and   bone windows were generated. Coronal and sagittal reformats. CT dose reduction   was achieved through use of a standardized protocol tailored for this   examination and automatic exposure control for dose modulation. FINDINGS:   The ventricles and sulci are normal in size, shape and configuration. . There is   no significant white matter disease. There is no intracranial hemorrhage,   extra-axial collection, or mass effect. The basilar cisterns are open. No CT   evidence of acute infarct. The bone windows demonstrate no abnormalities. The visualized portions of the   paranasal sinuses and mastoid air cells are clear.                     Past Medical History:   Diagnosis Date    Breast cancer ([address]     left    Diabetes ([address]     Heart failure ([address]     High cholesterol     Hypertension     Hypothyroid     Seizures ([address]     as a child     Past Surgical History:   Procedure Laterality Date    HX BACK SURGERY      implants placed between vertebrae     HX BREAST BIOPSY Right 2017    benign    HX COLONOSCOPY      HX DILATION AND CURETTAGE      HX HYSTERECTOMY      HX MASTECTOMY N/A 05/04/2022    LEFT PARTIAL MASTECTOMY WITH SENTINEL NODE BIOPSY performed by Matilda Maldonado MD at [address] [facility]    IR INSERT/REPLACE SPINE NEUROSTIM GENERATOR  Left 11/19/2018    Pt states stimulator     Prior Level of Function/Home Situation: Independent  Home Situation  Home Environment: Private residence  # Steps to Enter: 0  One/Two Story Residence: One story  Living Alone: No  Support Systems: Spouse/Significant Other  Patient Expects to be Discharged to[de-identified] Home  Current DME Used/Available at Home: None  Diet prior to admission: Soft/Regular  Current Diet:  Soft and bite-sized   Cognitive and Communication Status:  Neurologic State: Alert  Orientation Level: Oriented X4           Pain:  Pain Scale 1: Numeric (0 - 10)  Pain Intensity 1: 0       After treatment:   Patient left in no apparent distress in bed, Call bell within reach, and Nursing notified    COMMUNICATION/EDUCATION:   Patient was educated regarding purpose of SLP assessment, POC, diet recs and sw safety precautions. Patient demonstrated Good understanding as evidenced by verbal responsiveness. The patient's plan of care including recommendations, planned interventions, and recommended diet changes were discussed with: Registered nurse and Physician. Patient/family agree to work toward stated goals and plan of care.     Thank you for this referral.  Mary Rosa M.S. CCC-SLP
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
## 2022-09-20 NOTE — PROGRESS NOTES
Reason for Admission:  TIA                     RUR Score:    N/A                 Plan for utilizing home health:   Pt signed Choice Letter for home health. Referral sent via [employer]. Uses no DME. PCP: First and Last name:  Alie Rodney MD     Name of Practice:    Are you a current patient: Yes/No: Yes   Approximate date of last visit: Seen a few mos ago. Can you participate in a virtual visit with your PCP: Yes/Call. Current Advanced Directive/Advance Care Plan: No Order      Healthcare Decision Maker:              Primary Decision MakeFabio Genaroutdaniel - Spouse - [phone]                  Transition of Care Plan:    D/C Plan is home with  & home health.  to transport home. Send Rxs to [email or web address] on [employer].

## 2022-09-20 NOTE — PROGRESS NOTES
Medicare Outpatient Observation Notice (MOON)/ [place] Outpatient Observation Notice ([facility]) provided to patient/representative with verbal explanation of the notice. Time allotted for questions regarding the notice. Patient /representative provided a completed copy of the MOON/VOON notice. Copy placed on bedside chart.
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
## 2022-09-20 NOTE — Clinical Note
Patient Class[de-identified] OBSERVATION [104]   Type of Bed: Telemetry [19]   Cardiac Monitoring Required?: Yes   Reason for Observation: TIA   Admitting Diagnosis: TIA (transient ischemic attack) [[id number]]   Admitting Physician: Crystal Rivera   Attending Physician: Crystal Rivera

## 2022-09-20 NOTE — ACP (ADVANCE CARE PLANNING)
Advance Care Planning   Healthcare Decision Maker:       Primary Decision Maker: Reeda Kanner [facility] - [phone]

## 2022-09-20 NOTE — PROGRESS NOTES
Problem: Mobility Impaired (Adult and Pediatric)  Goal: *Acute Goals and Plan of Care (Insert Text)  Description: I with LE HEP x7 days  Mod I with all transfers x 7 days  Mod I with bed mob x7 days  Amb 50-75ft with LRAD and SBAx1 x7 days    Pt stated goal: to go home  Outcome: Not Met    PHYSICAL THERAPY EVALUATION  Patient: Chris Palma (13 y.o. female)  Date: 9/20/2022  Primary Diagnosis: TIA (transient ischemic attack) [G45.9]       Precautions:        ASSESSMENT    67yo F admitted to hospital with TIA/? CVA presents to PT with decreased gt, transfers, overall functional mobility. Pt supine on stretcher upon PT arrival, cleared by nursing to work with PT. Initial head CT neg for acute infarct. Pt does report hx of recent falls (falling out of bed) within the past couple of days. Pt lives with spouse in 1 story home with 0 DENNY home. PTA pt reports I with ADLs and amb without AD. Pt alert and oriented x4. Pt currently SBA with supine to sit EOB, CGA for sit to stand transfers. Pt able to amb approx 20ft in room with CGAx1. Pt amb with slow lawrence, slightly unsteady but cautious, however no LOB. Pt did not c/o feeling dizzy or lightheaded during session today. Pt may benefit from skilled PT to ensure safety with amb. Pt states that she sleeps on the edge of her bed and that is why she fell out. PT discussed with her possibly getting  bedrails to prevent future falls. Pt states she would look into that. Recommend HHPT upon d/c at this time. PLAN :  Recommendations and Planned Interventions: bed mobility training, transfer training, gait training, therapeutic exercises, patient and family training/education, and therapeutic activities      Frequency/Duration: Patient will be followed by physical therapy:  3 times a week to address goals.     Recommendation for discharge: (in order for the patient to meet his/her long term goals)  Home with Home Health Therapy             SUBJECTIVE:   Patient supine in stretcher upon PT arrival, agreeable to work with PT    OBJECTIVE DATA SUMMARY:   HISTORY:    Past Medical History:   Diagnosis Date    Bre[address] [facility] ([facility]     left    Diabetes ([facility] [address] [facility]     Heart failure ([facility] [address] [facility]     High cholesterol     Hypertension     Hypothyroid     Seizures ([facility] [address] [facility]     as a child     Past Surgical History:   Procedure Laterality Date    HX BACK SURGERY      implants placed between vertebrae     HX BREAST BIOPSY Right 2017    benign    HX COLONOSCOPY      HX DILATION AND CURETTAGE      HX HYSTERECTOMY      HX MASTECTOMY N/A 05/04/2022    LEFT PARTIAL MASTECTOMY WITH SENTINEL NODE BIOPSY performed by Valentin More MD at [address]    IR INSERT/REPLACE SPINE NEUROSTIM GENERATOR  Left 11/19/2018    Pt states stimulator       Personal factors and/or comorbidities impacting plan of care:     Home Situation  Home Environment: Private residence  # Steps to Enter: 0  One/Two Story Residence: One story  Living Alone: No  Support Systems: Spouse/Significant Other  Patient Expects to be Discharged to[de-identified] Home  Current DME Used/Available at Home: None        EXAMINATION/PRESENTATION/DECISION MAKING:   Critical Behavior:  Neurologic State: Alert  Orientation Level: Oriented X4              Range Of Motion:  AROM: Within functional limits     B LE    Strength:    Strength: Generally decreased, functional  Grossly 4/5 B LE    Tone & Sensation:         Intact to LT B LE    Coordination:   B heel to shin x 5 reps WNL    B finger to nose x 5 reps WNL      Functional Mobility:  Bed Mobility:     Supine to Sit: Stand-by assistance  Sit to Supine: Stand-by assistance  Scooting: Stand-by assistance  Transfers:  Sit to Stand: Contact guard assistance  Stand to Sit: Contact guard assistance  Stand Pivot Transfers: Contact guard assistance                    Balance:   Sitting: Intact  Standing: Intact  Ambulation/Gait Training:  Distance (ft): 20 Feet (ft)  Assistive Device: Gait belt  Ambulation - Level of Assistance: Contact guard assistance               Functional Measure:  [facility] Mobility Inpatient Short Form  How much difficulty does the patient currently have. .. Unable A Lot A Little None   1. Turning over in bed (including adjusting bedclothes, sheets and blankets)? [] 1   [] 2   [] 3   [x] 4   2. Sitting down on and standing up from a chair with arms ( e.g., wheelchair, bedside commode, etc.)   [] 1   [] 2   [x] 3   [] 4   3. Moving from lying on back to sitting on the side of the bed? [] 1   [] 2   [] 3   [x] 4          How much help from another person does the patient currently need. .. Total A Lot A Little None   4. Moving to and from a bed to a chair (including a wheelchair)? [] 1   [] 2   [x] 3   [] 4   5. Need to walk in hospital room? [] 1   [] 2   [x] 3   [] 4   6. Climbing 3-5 steps with a railing? [] 1   [] 2   [x] 3   [] 4   © 2007, Trustees of [address] [place] [address] Box [facility], under license to [employer]. All rights reserved     Score:  Initial: 20 Most Recent: X (Date: -- )   Interpretation of Tool:  Represents activities that are increasingly more difficult (i.e. Bed mobility, Transfers, Gait).   Score 24 23 22-20 19-15 14-10 9-7 6   Modifier CH CI CJ CK CL CM CN           Physical Therapy Evaluation Charge Determination   History Examination Presentation Decision-Making   MEDIUM  Complexity : 1-2 comorbidities / personal factors will impact the outcome/ POC  MEDIUM Complexity : 3 Standardized tests and measures addressing body structure, function, activity limitation and / or participation in recreation  LOW Complexity : Stable, uncomplicated  Other Functional Measure [facility] 6 LOW      Based on the above components, the patient evaluation is determined to be of the following complexity level: LOW     Pain Rating:  No c/o pain during session today    Activity Tolerance:   Good      After treatment patient left in no apparent distress:   Supine in bed, Call bell within reach, and stretcher locked and in lowest level          COMMUNICATION/EDUCATION:   The patients plan of care was discussed with: Registered nurse. Fall prevention education was provided and the patient/caregiver indicated understanding. and Patient/family agree to work toward stated goals and plan of care.       Thank you for this referral.  Hadley Fisher   Time Calculation: 20 mins
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
## 2022-09-20 NOTE — PROGRESS NOTES
Admission Medication Reconciliation:    Information obtained from:  Patient    Comments/Recommendations: Reviewed PTA medications and patient's allergies. Removed diphenhydramine 25 mg        Allergies:  Darvon [propoxyphene], Demerol [meperidine], Milk, and Penicillin g    Significant PMH/Disease States:   Past Medical History:   Diagnosis Date    Breast cancer ([facility]     left    Diabetes ([facility]     Heart failure ([facility]     High cholesterol     Hypertension     Hypothyroid     Seizures ([facility]     as a child     Chief Complaint for this Admission:    Chief Complaint   Patient presents with    Fall     Prior to Admission Medications:   Prior to Admission Medications   Prescriptions Last Dose Informant Patient Reported? Taking? SITagliptin (Januvia) 100 mg tablet  Self Yes Yes   Sig: Take 50 mg by mouth daily. acetaminophen (TYLENOL) 650 mg TbER  Self Yes Yes   Sig: Take 650 mg by mouth as needed. aspirin 81 mg tablet  Self Yes Yes   Sig: Take 81 mg by mouth daily. carvediloL (COREG) 25 mg tablet  Self Yes Yes   Sig: Take 25 mg by mouth two (2) times daily (with meals). cholecalciferol, vitamin D3, 50 mcg (2,000 unit) tab  Self Yes Yes   Sig: Take 2,000 Units by mouth two (2) times a day.   gabapentin (NEURONTIN) 800 mg tablet  Self Yes Yes   Sig: Take 800 mg by mouth three (3) times daily. glipiZIDE (GLUCOTROL) 10 mg tablet  Self Yes Yes   Sig: Take 10 mg by mouth two (2) times a day. latanoprost (XALATAN) 0.005 % ophthalmic solution  Self Yes Yes   Sig: Administer 1 Drop to both eyes nightly. levothyroxine (SYNTHROID) 50 mcg tablet  Self Yes Yes   Sig: Take 50 mcg by mouth Daily (before breakfast). lisinopriL (PRINIVIL, ZESTRIL) 20 mg tablet  Self Yes Yes   Sig: Take 20 mg by mouth in the morning. metFORMIN (GLUCOPHAGE) 500 mg tablet  Self Yes Yes   Sig: Take 500 mg by mouth two (2) times daily (with meals). rosuvastatin (CRESTOR) 10 mg tablet  Self Yes Yes   Sig: Take 10 mg by mouth nightly. spironolactone (ALDACTONE) 25 mg tablet  Self Yes Yes   Sig: Take 25 mg by mouth daily. tiZANidine (ZANAFLEX) 4 mg tablet  Self Yes Yes   Sig: Take 4 mg by mouth nightly.       Facility-Administered Medications: None       Key Richter

## 2022-09-20 NOTE — ED TRIAGE NOTES
EMS was called because patient fell out of the bed at 0500,  said she was not acting like herself so he called EMS. EMS reports her initials blood glucose was 79, checked again and it was 70, EMS gave 100 ml of d10 repeat glucose was 160. Patient says that she does not remember the fall just remembers waking up to her  helping her up.

## 2022-09-21 LAB
BASOPHILS # BLD: 0.1 K/UL (ref 0–0.1)
BASOPHILS NFR BLD: 2 % (ref 0–1)
CHOLEST SERPL-MCNC: 84 MG/DL
DIFFERENTIAL METHOD BLD: ABNORMAL
ECHO AO ASC DIAM: 2.9 CM
ECHO AO ASCENDING AORTA INDEX: 1.79 CM/M2
ECHO AO ROOT DIAM: 2.7 CM
ECHO AO ROOT INDEX: 1.67 CM/M2
ECHO AV MEAN GRADIENT: 5 MMHG
ECHO AV MEAN VELOCITY: 1 M/S
ECHO AV PEAK GRADIENT: 9 MMHG
ECHO AV PEAK VELOCITY: 1.5 M/S
ECHO AV VELOCITY RATIO: 0.73
ECHO AV VTI: 29.9 CM
ECHO LA AREA 2C: 14.2 CM2
ECHO LA AREA 4C: 18.1 CM2
ECHO LA DIAMETER INDEX: 2.35 CM/M2
ECHO LA DIAMETER: 3.8 CM
ECHO LA MAJOR AXIS: 4.2 CM
ECHO LA MINOR AXIS: 4.4 CM
ECHO LA TO AORTIC ROOT RATIO: 1.41
ECHO LA VOL 4C: 57 ML (ref 22–52)
ECHO LA VOL BP: 46 ML (ref 22–52)
ECHO LA VOL/BSA BIPLANE: 28 ML/M2 (ref 16–34)
ECHO LA VOLUME INDEX A4C: 35 ML/M2 (ref 16–34)
ECHO LV E' LATERAL VELOCITY: 9 CM/S
ECHO LV E' SEPTAL VELOCITY: 5 CM/S
ECHO LV EDV A2C: 48 ML
ECHO LV EDV A4C: 42 ML
ECHO LV EDV INDEX A4C: 26 ML/M2
ECHO LV EDV NDEX A2C: 30 ML/M2
ECHO LV EJECTION FRACTION A2C: 35 %
ECHO LV EJECTION FRACTION A4C: 52 %
ECHO LV ESV A2C: 31 ML
ECHO LV ESV A4C: 20 ML
ECHO LV ESV INDEX A2C: 19 ML/M2
ECHO LV ESV INDEX A4C: 12 ML/M2
ECHO LV FRACTIONAL SHORTENING: 23 % (ref 28–44)
ECHO LV INTERNAL DIMENSION DIASTOLE INDEX: 2.16 CM/M2
ECHO LV INTERNAL DIMENSION DIASTOLIC: 3.5 CM (ref 3.9–5.3)
ECHO LV INTERNAL DIMENSION SYSTOLIC INDEX: 1.67 CM/M2
ECHO LV INTERNAL DIMENSION SYSTOLIC: 2.7 CM
ECHO LV IVSD: 1.2 CM (ref 0.6–0.9)
ECHO LV MASS 2D: 135.8 G (ref 67–162)
ECHO LV MASS INDEX 2D: 83.8 G/M2 (ref 43–95)
ECHO LV POSTERIOR WALL DIASTOLIC: 1.2 CM (ref 0.6–0.9)
ECHO LV RELATIVE WALL THICKNESS RATIO: 0.69
ECHO LVOT AV VTI INDEX: 0.63
ECHO LVOT MEAN GRADIENT: 2 MMHG
ECHO LVOT PEAK GRADIENT: 5 MMHG
ECHO LVOT PEAK VELOCITY: 1.1 M/S
ECHO LVOT VTI: 18.9 CM
ECHO MV A VELOCITY: 1.51 M/S
ECHO MV E VELOCITY: 1.07 M/S
ECHO MV E/A RATIO: 0.71
ECHO MV E/E' LATERAL: 11.89
ECHO MV E/E' RATIO (AVERAGED): 16.64
ECHO MV E/E' SEPTAL: 21.4
ECHO MV LVOT VTI INDEX: 2.08
ECHO MV MAX VELOCITY: 1.8 M/S
ECHO MV MEAN GRADIENT: 3 MMHG
ECHO MV MEAN VELOCITY: 0.7 M/S
ECHO MV PEAK GRADIENT: 12 MMHG
ECHO MV REGURGITANT PEAK GRADIENT: 31 MMHG
ECHO MV REGURGITANT PEAK VELOCITY: 2.8 M/S
ECHO MV VTI: 39.4 CM
ECHO PV MAX VELOCITY: 1.1 M/S
ECHO PV PEAK GRADIENT: 4 MMHG
ECHO PVEIN PEAK D VELOCITY: 0.4 M/S
ECHO PVEIN PEAK S VELOCITY: 0.3 M/S
ECHO PVEIN S/D RATIO: 0.8 NO UNITS
ECHO RV BASAL DIMENSION: 3.1 CM
ECHO RV MID DIMENSION: 2.3 CM
ECHO RV TAPSE: 1.7 CM (ref 1.7–?)
ECHO TV REGURGITANT MAX VELOCITY: 2 M/S
ECHO TV REGURGITANT PEAK GRADIENT: 16 MMHG
EOSINOPHIL # BLD: 0 K/UL (ref 0–0.4)
EOSINOPHIL NFR BLD: 0 % (ref 0–7)
ERYTHROCYTE [DISTWIDTH] IN BLOOD BY AUTOMATED COUNT: 15.9 % (ref 11.5–14.5)
EST. AVERAGE GLUCOSE BLD GHB EST-MCNC: 177 MG/DL
GLUCOSE BLD STRIP.AUTO-MCNC: 128 MG/DL (ref 65–100)
GLUCOSE BLD STRIP.AUTO-MCNC: 129 MG/DL (ref 65–100)
GLUCOSE BLD STRIP.AUTO-MCNC: 130 MG/DL (ref 65–100)
GLUCOSE BLD STRIP.AUTO-MCNC: 57 MG/DL (ref 65–100)
GLUCOSE BLD STRIP.AUTO-MCNC: 80 MG/DL (ref 65–100)
HBA1C MFR BLD: 7.8 % (ref 4–5.6)
HCT VFR BLD AUTO: 33.4 % (ref 35–47)
HDLC SERPL-MCNC: 31 MG/DL
HDLC SERPL: 2.7 {RATIO} (ref 0–5)
HGB BLD-MCNC: 10.9 G/DL (ref 11.5–16)
IMM GRANULOCYTES # BLD AUTO: 0.4 K/UL (ref 0–0.04)
IMM GRANULOCYTES NFR BLD AUTO: 4 % (ref 0–0.5)
LDLC SERPL CALC-MCNC: 27.8 MG/DL (ref 0–100)
LEFT ARM BP: 143 MMHG
LEFT CCA DIST DIAS: 15.3 CM/S
LEFT CCA DIST SYS: 80.2 CM/S
LEFT CCA PROX DIAS: 21.4 CM/S
LEFT CCA PROX SYS: 103 CM/S
LEFT ECA DIAS: 0 CM/S
LEFT ECA SYS: 77.1 CM/S
LEFT ICA DIST DIAS: 23.7 CM/S
LEFT ICA DIST SYS: 90.9 CM/S
LEFT ICA PROX DIAS: 22.2 CM/S
LEFT ICA PROX SYS: 78.7 CM/S
LEFT ICA/CCA SYS: 0.98
LEFT VERTEBRAL DIAS: 18.3 CM/S
LEFT VERTEBRAL SYS: 81.7 CM/S
LIPID PROFILE,FLP: NORMAL
LYMPHOCYTES # BLD: 2 K/UL (ref 0.8–3.5)
LYMPHOCYTES NFR BLD: 23 % (ref 12–49)
MCH RBC QN AUTO: 28.1 PG (ref 26–34)
MCHC RBC AUTO-ENTMCNC: 32.6 G/DL (ref 30–36.5)
MCV RBC AUTO: 86.1 FL (ref 80–99)
MONOCYTES # BLD: 0.9 K/UL (ref 0–1)
MONOCYTES NFR BLD: 10 % (ref 5–13)
NEUTS SEG # BLD: 5.5 K/UL (ref 1.8–8)
NEUTS SEG NFR BLD: 61 % (ref 32–75)
NRBC # BLD: 0.23 K/UL (ref 0–0.01)
NRBC BLD-RTO: 2.6 PER 100 WBC
PERFORMED BY, TECHID: ABNORMAL
PERFORMED BY, TECHID: NORMAL
PLATELET # BLD AUTO: 203 K/UL (ref 150–400)
PMV BLD AUTO: 10.2 FL (ref 8.9–12.9)
RBC # BLD AUTO: 3.88 M/UL (ref 3.8–5.2)
RIGHT CCA DIST DIAS: 18.3 CM/S
RIGHT CCA DIST SYS: 100 CM/S
RIGHT CCA PROX DIAS: 16.8 CM/S
RIGHT CCA PROX SYS: 84 CM/S
RIGHT ECA DIAS: 5.35 CM/S
RIGHT ECA SYS: 93.2 CM/S
RIGHT ICA DIST DIAS: 16 CM/S
RIGHT ICA DIST SYS: 68 CM/S
RIGHT ICA PROX DIAS: 16.8 CM/S
RIGHT ICA PROX SYS: 96.2 CM/S
RIGHT ICA/CCA SYS: 0.96
RIGHT VERTEBRAL DIAS: 14.5 CM/S
RIGHT VERTEBRAL SYS: 65.7 CM/S
TRIGL SERPL-MCNC: 126 MG/DL (ref ?–150)
VAS LEFT SUBCLAVIAN PROX EDV: 0 CM/S
VAS LEFT SUBCLAVIAN PROX PSV: 121 CM/S
VAS RIGHT SUBCLAVIAN PROX EDV: 0 CM/S
VAS RIGHT SUBCLAVIAN PROX PSV: 86.3 CM/S
VLDLC SERPL CALC-MCNC: 25.2 MG/DL
WBC # BLD AUTO: 9 K/UL (ref 3.6–11)

## 2022-09-21 PROCEDURE — 74011250637 HC RX REV CODE- 250/637: Performed by: INTERNAL MEDICINE

## 2022-09-21 PROCEDURE — 82962 GLUCOSE BLOOD TEST: CPT

## 2022-09-21 PROCEDURE — 96372 THER/PROPH/DIAG INJ SC/IM: CPT

## 2022-09-21 PROCEDURE — 97165 OT EVAL LOW COMPLEX 30 MIN: CPT

## 2022-09-21 PROCEDURE — G0378 HOSPITAL OBSERVATION PER HR: HCPCS

## 2022-09-21 PROCEDURE — 80061 LIPID PANEL: CPT

## 2022-09-21 PROCEDURE — 74011250636 HC RX REV CODE- 250/636: Performed by: INTERNAL MEDICINE

## 2022-09-21 PROCEDURE — 83036 HEMOGLOBIN GLYCOSYLATED A1C: CPT

## 2022-09-21 PROCEDURE — 93880 EXTRACRANIAL BILAT STUDY: CPT | Performed by: SURGERY

## 2022-09-21 PROCEDURE — 85025 COMPLETE CBC W/AUTO DIFF WBC: CPT

## 2022-09-21 PROCEDURE — 36415 COLL VENOUS BLD VENIPUNCTURE: CPT

## 2022-09-21 PROCEDURE — 97530 THERAPEUTIC ACTIVITIES: CPT

## 2022-09-21 RX ORDER — GLIPIZIDE 5 MG/1
5 TABLET ORAL 2 TIMES DAILY
Status: DISCONTINUED | OUTPATIENT
Start: 2022-09-21 | End: 2022-09-22 | Stop reason: HOSPADM

## 2022-09-21 RX ADMIN — ENOXAPARIN SODIUM 40 MG: 100 INJECTION SUBCUTANEOUS at 10:58

## 2022-09-21 RX ADMIN — LATANOPROST 1 DROP: 50 SOLUTION OPHTHALMIC at 20:40

## 2022-09-21 RX ADMIN — GABAPENTIN 300 MG: 300 CAPSULE ORAL at 16:39

## 2022-09-21 RX ADMIN — FAMOTIDINE 20 MG: 20 TABLET ORAL at 10:58

## 2022-09-21 RX ADMIN — TOPIRAMATE 25 MG: 25 TABLET, FILM COATED ORAL at 16:39

## 2022-09-21 RX ADMIN — CARVEDILOL 25 MG: 12.5 TABLET, FILM COATED ORAL at 16:39

## 2022-09-21 RX ADMIN — ATORVASTATIN CALCIUM 20 MG: 20 TABLET, FILM COATED ORAL at 20:35

## 2022-09-21 RX ADMIN — CARVEDILOL 25 MG: 12.5 TABLET, FILM COATED ORAL at 10:58

## 2022-09-21 RX ADMIN — GLIPIZIDE 10 MG: 5 TABLET ORAL at 11:00

## 2022-09-21 RX ADMIN — Medication 1000 UNITS: at 10:58

## 2022-09-21 RX ADMIN — ACETAMINOPHEN 650 MG: 650 TABLET, FILM COATED, EXTENDED RELEASE ORAL at 05:35

## 2022-09-21 RX ADMIN — SODIUM CHLORIDE 75 ML/HR: 9 INJECTION, SOLUTION INTRAVENOUS at 05:59

## 2022-09-21 RX ADMIN — GABAPENTIN 300 MG: 300 CAPSULE ORAL at 10:58

## 2022-09-21 RX ADMIN — TOPIRAMATE 25 MG: 25 TABLET, FILM COATED ORAL at 10:58

## 2022-09-21 RX ADMIN — CLOPIDOGREL BISULFATE 75 MG: 75 TABLET ORAL at 10:58

## 2022-09-21 RX ADMIN — FAMOTIDINE 20 MG: 20 TABLET ORAL at 20:33

## 2022-09-21 RX ADMIN — LEVOTHYROXINE SODIUM 50 MCG: 0.03 TABLET ORAL at 05:35

## 2022-09-21 RX ADMIN — SULFAMETHOXAZOLE AND TRIMETHOPRIM 1 TABLET: 800; 160 TABLET ORAL at 20:32

## 2022-09-21 RX ADMIN — ACETAMINOPHEN 650 MG: 650 TABLET, FILM COATED, EXTENDED RELEASE ORAL at 20:34

## 2022-09-21 RX ADMIN — GABAPENTIN 300 MG: 300 CAPSULE ORAL at 20:35

## 2022-09-21 RX ADMIN — DOCUSATE SODIUM 100 MG: 100 CAPSULE, LIQUID FILLED ORAL at 10:58

## 2022-09-21 RX ADMIN — SULFAMETHOXAZOLE AND TRIMETHOPRIM 1 TABLET: 800; 160 TABLET ORAL at 10:59

## 2022-09-21 NOTE — PROGRESS NOTES
Progress Note    Patient: Tanner Dawn MRN: [id number]  SSN: [id number]    YOB: 1945  Age: 65 y.o. Sex: female      Admit Date: 9/20/2022    LOS: 0 days     Subjective:     65years old with breast cancer status postchemotherapy and radiation treatment echocardiogram shows normal EF with mild diastolic dysfunction, carotid Doppler shows no obstructive lesions of significance. Patient is back to her baseline EEG pending. Patient unable to get MRI because of device for spinal cord stimulation    Objective:     Vitals:    09/21/22 0400 09/21/22 0755 09/21/22 1112 09/21/22 1200   BP:  (!) 145/69 135/77    Pulse: 76 88 70 70   Resp:  18 18    Temp:  97.9 °F (36.6 °C) 97.8 °F (36.6 °C)    SpO2:  100% 100%    Weight:       Height:            Intake and Output:  Current Shift: No intake/output data recorded. Last three shifts: No intake/output data recorded. Physical Exam:   General:  Alert, cooperative, no distress, appears stated age. Eyes:  Conjunctivae/corneas clear. PERRL, EOMs intact. Fundi benign   Ears:  Normal TMs and external ear canals both ears. Nose: Nares normal. Septum midline. Mucosa normal. No drainage or sinus tenderness. Mouth/Throat: Lips, mucosa, and tongue normal. Teeth and gums normal.   Neck: Supple, symmetrical, trachea midline, no adenopathy, thyroid: no enlargment/tenderness/nodules, no carotid bruit and no JVD. Back:   Symmetric, no curvature. ROM normal. No CVA tenderness. Lungs:   Clear to auscultation bilaterally. Heart:  Regular rate and rhythm, S1, S2 normal, no murmur, click, rub or gallop. Abdomen:   Soft, non-tender. Bowel sounds normal. No masses,  No organomegaly. Extremities: Extremities normal, atraumatic, no cyanosis or edema. Pulses: 2+ and symmetric all extremities. Skin: Skin color, texture, turgor normal. No rashes or lesions   Lymph nodes: Cervical, supraclavicular, and axillary nodes normal.   Neurologic: CNII-XII intact. Normal strength, sensation and reflexes throughout. Lab/Data Review: All lab results for the last 24 hours reviewed.      Recent Results (from the past 24 hour(s))   DUPLEX CAROTID BILATERAL    Collection Time: 09/20/22  2:42 PM   Result Value Ref Range    Left CCA dist sys 80.2 cm/s    Left CCA dist hess 15.3 cm/s    Left CCA prox sys 103.0 cm/s    Left CCA prox hess 21.4 cm/s    Left ICA dist sys 90.9 cm/s    Left ICA dist hess 23.7 cm/s    Left ICA prox sys 78.7 cm/s    Left ICA prox hess 22.2 cm/s    Left ECA sys 77.1 cm/s    LEFT EXTERNAL CAROTID ARTERY D 0.00 cm/s    Left subclavian prox .0 cm/s    Left subclavian prox EDV 0.0 cm/s    Left vertebral sys 81.7 cm/s    LEFT VERTEBRAL ARTERY D 18.30 cm/s    Right cca dist sys 100.0 cm/s    Right CCA dist hess 18.3 cm/s    Right CCA prox sys 84.0 cm/s    Right CCA prox hess 16.8 cm/s    Right ICA dist sys 68.0 cm/s    Right ICA dist hess 16.0 cm/s    Right ICA prox sys 96.2 cm/s    Right ICA prox hess 16.8 cm/s    Right eca sys 93.2 cm/s    RIGHT EXTERNAL CAROTID ARTERY D 5.35 cm/s    Right subclavian prox PSV 86.3 cm/s    Right subclavian prox EDV 0.0 cm/s    Right vertebral sys 65.7 cm/s    RIGHT VERTEBRAL ARTERY D 14.50 cm/s    Right ICA/CCA sys 0.96     Left ICA/CCA sys 0.98     Left arm  mmHg   ECHO ADULT COMPLETE    Collection Time: 09/20/22  3:20 PM   Result Value Ref Range    LV EDV A2C 48 mL    LV EDV A4C 42 mL    LV ESV A2C 31 mL    LV ESV A4C 20 mL    IVSd 1.2 0.6 - 0.9 cm    LVIDd 3.5 3.9 - 5.3 cm    LVIDs 2.7 cm    LVOT Mean Gradient 2 mmHg    LVOT VTI 18.9 cm    LVOT Peak Velocity 1.1 m/s    LVOT Peak Gradient 5 mmHg    LVPWd 1.2 0.6 - 0.9 cm    LV E' Lateral Velocity 9 cm/s    LV E' Septal Velocity 5 cm/s    LV Ejection Fraction A2C 35 %    LV Ejection Fraction A4C 52 %    LA Minor Axis 4.4 cm    LA Major [place] 4.2 cm    LA Area 2C 14.2 cm2    LA Area 4C 18.1 cm2    LA Volume BP 46 22 - 52 mL    LA Diameter 3.8 cm    AV Mean Gradient 5 mmHg    AV VTI 29.9 cm    AV Mean Velocity 1.0 m/s    AV Peak Velocity 1.5 m/s    AV Peak Gradient 9 mmHg    Aortic Root 2.7 cm    Ascending Aorta 2.9 cm    MR Peak Velocity 2.8 m/s    MR Peak Gradient 31 mmHg    MV Max Velocity 1.8 m/s    MV Peak Gradient 12 mmHg    MV A Velocity 1.51 m/s    MV E Velocity 1.07 m/s    MV Mean Gradient 3 mmHg    MV VTI 39.4 cm    MV Mean Velocity 0.7 m/s    PV Max Velocity 1.1 m/s    PV Peak Gradient 4 mmHg    Pulm Vein Peak D Velocity 0.4 m/s    Pulm Vein Peak S Velocity 0.3 m/s    Pulm Vein S/D 0.8 no units    RV Basal Dimension 3.1 cm    RV Mid Dimension 2.3 cm    TAPSE 1.7 1.7 cm    TR Max Velocity 2.00 m/s    TR Peak Gradient 16 mmHg    Fractional Shortening 2D 23 28 - 44 %    LV ESV Index A4C 12 mL/m2    LV EDV Index A4C 26 mL/m2    LV ESV Index A2C 19 mL/m2    LV EDV Index A2C 30 mL/m2    LVIDd Index 2.16 cm/m2    LVIDs Index 1.67 cm/m2    LV RWT Ratio 0.69     LV Mass 2D 135.8 67 - 162 g    LV Mass 2D Index 83.8 43 - 95 g/m2    MV E/A 0.71     E/E' Ratio (Averaged) 16.64     E/E' Lateral 11.89     E/E' Septal 21.40     LA Volume Index BP 28 16 - 34 ml/m2    LA Size Index 2.35 cm/m2    LA/AO Root Ratio 1.41     Ao Root Index 1.67 cm/m2    Ascending Aorta Index 1.79 cm/m2    AV Velocity Ratio 0.73     LVOT:AV VTI Index 0.63     MV:LVOT VTI Index 2.08     LA Volume 4C 57 22 - 52 mL    LA Volume Index 4C 35 16 - 34 mL/m2   GLUCOSE, POC    Collection Time: 09/20/22  4:58 PM   Result Value Ref Range    Glucose (POC) 142 (H) 65 - 100 mg/dL    Performed by [facility]    GLUCOSE, POC    Collection Time: 09/20/22  8:01 PM   Result Value Ref Range    Glucose (POC) 101 (H) 65 - 100 mg/dL    Performed by Aura Merida    GLUCOSE, POC    Collection Time: 09/21/22  5:44 AM   Result Value Ref Range    Glucose (POC) 57 (L) 65 - 100 mg/dL    Performed by [address] [place] [address], POC    Collection Time: 09/21/22  6:11 AM   Result Value Ref Range    Glucose (POC) 80 65 - 100 mg/dL Performed by Rosalind Esparza    CBC WITH AUTOMATED DIFF    Collection Time: 09/21/22  7:31 AM   Result Value Ref Range    WBC 9.0 3.6 - 11.0 K/uL    RBC 3.88 3.80 - 5.20 M/uL    HGB 10.9 (L) 11.5 - 16.0 g/dL    HCT 33.4 (L) 35.0 - 47.0 %    MCV 86.1 80.0 - 99.0 FL    MCH 28.1 26.0 - 34.0 PG    MCHC 32.6 30.0 - 36.5 g/dL    RDW 15.9 (H) 11.5 - 14.5 %    PLATELET 412 260 - 576 K/uL    MPV 10.2 8.9 - 12.9 FL    NRBC 2.6 (H) 0.0  WBC    ABSOLUTE NRBC 0.23 (H) 0.00 - 0.01 K/uL    NEUTROPHILS 61 32 - 75 %    LYMPHOCYTES 23 12 - 49 %    MONOCYTES 10 5 - 13 %    EOSINOPHILS 0 0 - 7 %    BASOPHILS 2 (H) 0 - 1 %    IMMATURE GRANULOCYTES 4 (H) 0 - 0.5 %    ABS. NEUTROPHILS 5.5 1.8 - 8.0 K/UL    ABS. LYMPHOCYTES 2.0 0.8 - 3.5 K/UL    ABS. MONOCYTES 0.9 0.0 - 1.0 K/UL    ABS. EOSINOPHILS 0.0 0.0 - 0.4 K/UL    ABS. BASOPHILS 0.1 0.0 - 0.1 K/UL    ABS. IMM.  GRANS. 0.4 (H) 0.00 - 0.04 K/UL    DF AUTOMATED     GLUCOSE, POC    Collection Time: 09/21/22 12:58 PM   Result Value Ref Range    Glucose (POC) 128 (H) 65 - 100 mg/dL    Performed by Christa Pruett          Assessment:     Active Problems:    TIA (transient ischemic attack) (9/20/2022)      Hypoglycemia adjust meds   Plan:     Is going for EEG for possible discharge in a.m. discussed with the neurologist and also with the patient and her spouse    Signed By: Carline Espinal MD     September 21, 2022

## 2022-09-21 NOTE — PROGRESS NOTES
Nurse checked pts glucose. Reading at 57 on glucometer. Pt given 4oz of juice. Will reassess in 30 minutes.

## 2022-09-21 NOTE — PROGRESS NOTES
Problem: Falls - Risk of  Goal: *Absence of Falls  Description: Document Regina Jackman Fall Risk and appropriate interventions in the flowsheet.   Outcome: Progressing Towards Goal  Note: Fall Risk Interventions:  Mobility Interventions: PT Consult for mobility concerns, Patient to call before getting OOB, Bed/chair exit alarm         Medication Interventions: Bed/chair exit alarm, Patient to call before getting OOB, Teach patient to arise slowly    Elimination Interventions: Bed/chair exit alarm, Call light in reach, Patient to call for help with toileting needs    History of Falls Interventions: Bed/chair exit alarm
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
## 2022-09-21 NOTE — PROGRESS NOTES
OCCUPATIONAL THERAPY EVALUATION  Patient: Edwin Pollack (19 y.o. female)  Date: 9/21/2022  Primary Diagnosis: TIA (transient ischemic attack) [G45.9]       Precautions: fall risk       ASSESSMENT  Pt is a 64 y/o F with PMH of breast cancer, DM, and HF presenting to [facility] with c/o AMS and abnormal behavior at home, admitted 9/20  and being treated for CVA work up. Head CT shows no acute findings; MRI is not appropriate d/t mechanical device in back. Pt received semi-supine in bed upon arrival, AXO x4, and agreeable to OT evaluation at this time. Per pt report, pt lives with  in a one-story home with 0 DENNY, was IND with ADLs and mobility at OF. She reports one falls that brought her into the hospital and denies access to DME. Based on current observations, pt presents with deficits in generalized strength/AROM, bed mobility, static/dynamic sitting balance, static/dynamic standing balance, and functional activity tolerance impacting overall performance of ADLs and functional transfers/mobility. Pt currently requires SBA for bed mobility and sup>sit transfer. She demo'd intact sitting balance at EOB during BUE assessment (coordination intact and 4/5 grossly BUE). She req'd CGA for STS, bed>bathroom, bathroom commode transfer, and bathroom>EOB; 1 LOB noted, however, pt able to recover [facility].  entered room and OT educated pt on \"BE FAST\"; pt verbalized understanding. Pt was left at EOB w/  in room and all needs/call bell in reach; OT educated pt not to ambulate without A to reduce falls risk. Overall, pt tolerates session fair w/out c/o pain, dizziness, or SOB. Pt would benefit from continued skilled OT services to address current impairments and improve IND and safety with self cares and functional transfers/mobility. Current OT d/c recommendation home w/ family care once medically appropriate.     Other factors to consider for discharge: family/social support, DME, time since onset, severity of deficits, decline from functional baseline     Patient will benefit from skilled therapy intervention to address the above noted impairments. PLAN :  Recommendations and Planned Interventions: self care training, functional mobility training, therapeutic exercise, balance training, therapeutic activities, endurance activities, patient education, and home safety training    Frequency/Duration: Patient will be followed by occupational therapy:  3-5x/week to address goals. Recommendation for discharge: (in order for the patient to meet his/her long term goals)  Home with Family Care    This discharge recommendation:  Has been made in collaboration with the attending provider and/or case management    IF patient discharges home will need the following DME: none at this time       SUBJECTIVE:   Patient stated I am ready to go home.     OBJECTIVE DATA SUMMARY:   HISTORY:   Past Medical History:   Diagnosis Date    Breast cancer ([facility]     left    Diabetes ([facility]     Heart failure ([facility]     High cholesterol     Hypertension     Hypothyroid     Seizures ([facility]     as a child     Past Surgical History:   Procedure Laterality Date    HX BACK SURGERY      implants placed between vertebrae     HX BREAST BIOPSY Right 2017    benign    HX COLONOSCOPY      HX DILATION AND CURETTAGE      HX HYSTERECTOMY      HX MASTECTOMY N/A 05/04/2022    LEFT PARTIAL MASTECTOMY WITH SENTINEL NODE BIOPSY performed by Paul Oneill MD at [facility]    IR INSERT/REPLACE SPINE NEUROSTIM GENERATOR  Left 11/19/2018    Pt states stimulator       Expanded or extensive additional review of patient history:     Home Situation  Home Environment: Private residence  # Steps to Enter: 0  One/Two Story Residence: One story  Living Alone: No  Support Systems: Spouse/Significant Other  Patient Expects to be Discharged to[de-identified] Home  Current DME Used/Available at Home: None  Tub or Shower Type: Tub/Shower combination      EXAMINATION OF PERFORMANCE DEFICITS:  Cognitive/Behavioral Status:  Neurologic State: Alert  Orientation Level: Oriented X4  Cognition: Follows commands; Appropriate decision making; Appropriate safety awareness               Hearing: Auditory  Auditory Impairment: None      Range of Motion:  AROM: Within functional limits                         Strength:  Strength: Within functional limits                Coordination:     Fine Motor Skills-Upper: Right Intact; Left Intact    Gross Motor Skills-Upper: Left Intact; Right Intact    Tone & Sensation:     Sensation: Intact                      Balance:  Sitting: Intact; Without support  Standing: Intact; With support    Functional Mobility and Transfers for ADLs:  Bed Mobility:  Rolling: Stand-by assistance  Supine to Sit: Stand-by assistance  Scooting: Stand-by assistance    Transfers:  Sit to Stand: Contact guard assistance  Stand to Sit: Contact guard assistance  Bathroom Mobility: Contact guard assistance  Toilet Transfer : Contact guard assistance    ADL Assessment:                                            ADL Intervention and task modifications:       Grooming  Washing Face: Contact guard assistance                                  Therapeutic Exercise:  Pt will benefit from BUE HEP to improve participation in ADLs and mobility. Plan will be initiated at next session. Functional Measure:    Gordy Concepcion AM-PACTM \"6 Clicks\"                                                       Daily Activity Inpatient Short Form  How much help from another person does the patient currently need. .. Total; A Lot A Little None   1. Putting on and taking off regular lower body clothing? []  1 []  2 []  3 [x]  4   2. Bathing (including washing, rinsing, drying)? []  1 []  2 [x]  3 []  4   3. Toileting, which includes using toilet, bedpan or urinal? [] 1 []  2 [x]  3 []  4   4. Putting on and taking off regular upper body clothing? []  1 []  2 [x]  3 []  4   5.   Taking care of personal grooming such as brushing teeth? []  1 []  2 [x]  3 []  4   6. Eating meals? []  1 []  2 []  3 [x]  4   © 2007, Trustees of [address] [place] [address] [address] [address], under license to [employer]. All rights reserved     Score: 20/24     Interpretation of Tool:  Represents clinically-significant functional categories (i.e. Activities of daily living). Percentage of Impairment CH    0%   CI    1-19% CJ    20-39% CK    40-59% CL    60-79% CM    80-99% CN     100%   [facility]  Score 6-24 24 23 20-22 15-19 10-14 7-9 6         Occupational Therapy Evaluation Charge Determination   History Examination Decision-Making   LOW Complexity : Brief history review  LOW Complexity : 1-3 performance deficits relating to physical, cognitive , or psychosocial skils that result in activity limitations and / or participation restrictions  MEDIUM Complexity : Patient may present with comorbidities that affect occupational performnce. Miniml to moderate modification of tasks or assistance (eg, physical or verbal ) with assesment(s) is necessary to enable patient to complete evaluation       Based on the above components, the patient evaluation is determined to be of the following complexity level: LOW   Pain Rating:  No reports of pain    Activity Tolerance:   Fair and requires rest breaks    After treatment patient left in no apparent distress:    Caregiver / family present and sitting EOB ; Bed locked and set to lowest setting. COMMUNICATION/EDUCATION:   The patients plan of care was discussed with: Registered nurse. Patient/family have participated as able in goal setting and plan of care. and Patient/family agree to work toward stated goals and plan of care. This patients plan of care is appropriate for delegation to [facility].       Thank you for this referral.  Rossi Hansen OT  Time Calculation: 25 mins   Problem: Self Care Deficits Care Plan (Adult)  Goal: *Acute Goals and Plan of Care (Insert Text)  Description: Pt stated goal \"I want to go home\".      Pt will be IND sup <>sit in prep for EOB ADLs  Pt will be IND grooming standing at sinktop  Pt will be IND LE dressing sitting EOB/long sit  Pt will be IND sit <>  prep for toileting LRAD  Pt will be IND toileting/toilet transfer/cloth mgmt LRAD  Pt will be IND following UE HEP in prep for self care tasks   Outcome: Not Met

## 2022-09-22 VITALS
DIASTOLIC BLOOD PRESSURE: 77 MMHG | HEART RATE: 62 BPM | RESPIRATION RATE: 18 BRPM | TEMPERATURE: 98.1 F | HEIGHT: 62 IN | SYSTOLIC BLOOD PRESSURE: 126 MMHG | WEIGHT: 135 LBS | BODY MASS INDEX: 24.84 KG/M2 | OXYGEN SATURATION: 99 %

## 2022-09-22 LAB
BASOPHILS # BLD: 0.1 K/UL (ref 0–0.1)
BASOPHILS NFR BLD: 2 % (ref 0–1)
DIFFERENTIAL METHOD BLD: ABNORMAL
EOSINOPHIL # BLD: 0 K/UL (ref 0–0.4)
EOSINOPHIL NFR BLD: 1 % (ref 0–7)
ERYTHROCYTE [DISTWIDTH] IN BLOOD BY AUTOMATED COUNT: 16.1 % (ref 11.5–14.5)
GLUCOSE BLD STRIP.AUTO-MCNC: 101 MG/DL (ref 65–100)
HCT VFR BLD AUTO: 30.1 % (ref 35–47)
HGB BLD-MCNC: 9.8 G/DL (ref 11.5–16)
IMM GRANULOCYTES # BLD AUTO: 0.2 K/UL (ref 0–0.04)
IMM GRANULOCYTES NFR BLD AUTO: 3 % (ref 0–0.5)
LYMPHOCYTES # BLD: 1.7 K/UL (ref 0.8–3.5)
LYMPHOCYTES NFR BLD: 26 % (ref 12–49)
MCH RBC QN AUTO: 28.2 PG (ref 26–34)
MCHC RBC AUTO-ENTMCNC: 32.6 G/DL (ref 30–36.5)
MCV RBC AUTO: 86.7 FL (ref 80–99)
MONOCYTES # BLD: 0.7 K/UL (ref 0–1)
MONOCYTES NFR BLD: 11 % (ref 5–13)
NEUTS SEG # BLD: 3.7 K/UL (ref 1.8–8)
NEUTS SEG NFR BLD: 57 % (ref 32–75)
NRBC # BLD: 0.09 K/UL (ref 0–0.01)
NRBC BLD-RTO: 1.4 PER 100 WBC
PERFORMED BY, TECHID: ABNORMAL
PLATELET # BLD AUTO: 168 K/UL (ref 150–400)
PMV BLD AUTO: 10.3 FL (ref 8.9–12.9)
RBC # BLD AUTO: 3.47 M/UL (ref 3.8–5.2)
WBC # BLD AUTO: 6.4 K/UL (ref 3.6–11)

## 2022-09-22 PROCEDURE — 97116 GAIT TRAINING THERAPY: CPT

## 2022-09-22 PROCEDURE — 82962 GLUCOSE BLOOD TEST: CPT

## 2022-09-22 PROCEDURE — 74011250637 HC RX REV CODE- 250/637: Performed by: INTERNAL MEDICINE

## 2022-09-22 PROCEDURE — 74011250636 HC RX REV CODE- 250/636: Performed by: INTERNAL MEDICINE

## 2022-09-22 PROCEDURE — 85025 COMPLETE CBC W/AUTO DIFF WBC: CPT

## 2022-09-22 PROCEDURE — 96372 THER/PROPH/DIAG INJ SC/IM: CPT

## 2022-09-22 PROCEDURE — 97110 THERAPEUTIC EXERCISES: CPT

## 2022-09-22 PROCEDURE — G0378 HOSPITAL OBSERVATION PER HR: HCPCS

## 2022-09-22 PROCEDURE — 36415 COLL VENOUS BLD VENIPUNCTURE: CPT

## 2022-09-22 RX ORDER — DOCUSATE SODIUM 100 MG/1
100 CAPSULE, LIQUID FILLED ORAL 2 TIMES DAILY
Qty: 60 CAPSULE | Refills: 2 | Status: SHIPPED | OUTPATIENT
Start: 2022-09-22 | End: 2022-12-21

## 2022-09-22 RX ORDER — TOPIRAMATE 25 MG/1
25 TABLET ORAL 2 TIMES DAILY WITH MEALS
Qty: 60 TABLET | Refills: 0 | Status: SHIPPED | OUTPATIENT
Start: 2022-09-22

## 2022-09-22 RX ORDER — GLIPIZIDE 5 MG/1
5 TABLET ORAL 2 TIMES DAILY
Qty: 30 TABLET | Refills: 0 | Status: SHIPPED | OUTPATIENT
Start: 2022-09-22

## 2022-09-22 RX ORDER — FAMOTIDINE 20 MG/1
20 TABLET, FILM COATED ORAL EVERY 12 HOURS
Qty: 60 TABLET | Refills: 0 | Status: SHIPPED | OUTPATIENT
Start: 2022-09-22

## 2022-09-22 RX ORDER — ATORVASTATIN CALCIUM 20 MG/1
20 TABLET, FILM COATED ORAL
Qty: 30 TABLET | Refills: 0 | Status: SHIPPED | OUTPATIENT
Start: 2022-09-22

## 2022-09-22 RX ORDER — SULFAMETHOXAZOLE AND TRIMETHOPRIM 800; 160 MG/1; MG/1
1 TABLET ORAL EVERY 12 HOURS
Qty: 10 TABLET | Refills: 0 | Status: SHIPPED | OUTPATIENT
Start: 2022-09-22

## 2022-09-22 RX ORDER — DIPHENHYDRAMINE HCL 50 MG
50 CAPSULE ORAL
Qty: 30 CAPSULE | Refills: 0 | Status: SHIPPED | OUTPATIENT
Start: 2022-09-22 | End: 2022-10-02

## 2022-09-22 RX ORDER — GABAPENTIN 300 MG/1
300 CAPSULE ORAL 3 TIMES DAILY
Qty: 36 CAPSULE | Refills: 0 | Status: SHIPPED | OUTPATIENT
Start: 2022-09-22

## 2022-09-22 RX ADMIN — FAMOTIDINE 20 MG: 20 TABLET ORAL at 08:23

## 2022-09-22 RX ADMIN — GABAPENTIN 300 MG: 300 CAPSULE ORAL at 08:23

## 2022-09-22 RX ADMIN — CARVEDILOL 25 MG: 12.5 TABLET, FILM COATED ORAL at 08:22

## 2022-09-22 RX ADMIN — TOPIRAMATE 25 MG: 25 TABLET, FILM COATED ORAL at 08:22

## 2022-09-22 RX ADMIN — ENOXAPARIN SODIUM 40 MG: 100 INJECTION SUBCUTANEOUS at 08:27

## 2022-09-22 RX ADMIN — LEVOTHYROXINE SODIUM 50 MCG: 0.03 TABLET ORAL at 08:23

## 2022-09-22 RX ADMIN — SULFAMETHOXAZOLE AND TRIMETHOPRIM 1 TABLET: 800; 160 TABLET ORAL at 08:24

## 2022-09-22 RX ADMIN — CLOPIDOGREL BISULFATE 75 MG: 75 TABLET ORAL at 08:23

## 2022-09-22 RX ADMIN — Medication 1000 UNITS: at 08:24

## 2022-09-22 NOTE — PROGRESS NOTES
CM reviewed chart. Patient's discharge disposition is home with home health services provided by [facility]. Discharge is pending medical clearance. CM will continue to follow.

## 2022-09-22 NOTE — PROGRESS NOTES
PHYSICAL THERAPY TREATMENT  Patient: Anoop Martinez (12 y.o. female)  Date: 9/22/2022  Diagnosis: TIA (transient ischemic attack) [G45.9] <principal problem not specified>      Precautions:    Chart, physical therapy assessment, plan of care and goals were reviewed. ASSESSMENT  Patient continues with skilled PT services and is progressing towards goals. Patient supine in bed upon approach and agreed to therapy session after motivation to partake in therapy. Patient was mod I for bed mobility, supine<<>sit and scooting to EOB where patient demonstrated good unsupported sitting balance. Patient then was SBA for StS without and AD. Patient then ambulated 110 feet in hallway at [facility] without AD. Patient demonstrated slow but steady step through gait with no LOB or knee buckling but slight increased Rt sided leaning. Patient then returned to seated EOB at SBA and performed seated TE ( see details below) Patient then returned to supine in bed and left with call bell within reach and all needs meet. Other factors to consider for discharge: PLOF, assistance at home, level of deficits, acute medical state         PLAN :  Patient continues to benefit from skilled intervention to address the above impairments. Continue treatment per established plan of care. to address goals. Recommendation for discharge: (in order for the patient to meet his/her long term goals)  Home with [address] [place] [address]    This discharge recommendation:  Has been made in collaboration with the attending provider and/or case management    IF patient discharges home will need the following DME: none       SUBJECTIVE:   Patient stated I don't need any assistive device for walking.     OBJECTIVE DATA SUMMARY:   Critical Behavior:  Neurologic State: Alert  Orientation Level: Oriented X4  Cognition: Follows commands     Functional Mobility Training:  Bed Mobility:  Rolling: Modified independent  Supine to Sit: Modified independent  Sit to Supine: Modified independent  Scooting: Modified independent  Transfers:  Sit to Stand: Stand-by assistance  Stand to Sit: Stand-by assistance  Balance:  Sitting: Intact; Without support  Standing: Intact; Without support  Ambulation/Gait Training:  Distance (ft): 110 Feet (ft)  Assistive Device: Gait belt  Ambulation - Level of Assistance: Stand-by assistance;Contact guard assistance  Base of Support: Narrowed; Shift to right    Therapeutic Exercises:   1x20 ankle circles  1x20 LAQ  1x20 seated marches  1x20 hip Add/ABD  Pain Ratin/10    Activity Tolerance:   Bed locked and in lowest position Good  Please refer to the flowsheet for vital signs taken during this treatment. After treatment patient left in no apparent distress:   Bed returned to lowest position, Supine in bed, Call bell within reach, and Side rails x 3    COMMUNICATION/COLLABORATION:   The patients plan of care was discussed with: Registered nurse.        Problem: Mobility Impaired (Adult and Pediatric)  Goal: *Acute Goals and Plan of Care (Insert Text)  Description: I with LE HEP x7 days  Mod I with all transfers x 7 days  Mod I with bed mob x7 days  Amb 50-75ft with LRAD and SBAx1 x7 days    Pt stated goal: to go home  Outcome: Progressing Towards Goal     Problem: Patient Education: Go to Patient Education Activity  Goal: Patient/Family Education  Outcome: Progressing Towards Goal       John Jacome PTA   Time Calculation: 23 mins
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
## 2022-09-22 NOTE — DISCHARGE SUMMARY
Discharge Summary     Patient: Ahmet Bryant MRN: [id number]  SSN: [id number]    YOB: 1945  Age: 65 y.o. Sex: female       Admit Date: 9/20/2022    Discharge Date: 9/22/2022      Admission Diagnoses: TIA (transient ischemic attack) [G45.9]    Discharge Diagnoses:   Problem List as of 9/22/2022 Date Reviewed: 4/29/2021            Codes Class Noted - Resolved    TIA (transient ischemic attack) ICD-10-CM: G45.9  ICD-9-CM: 435.9  9/20/2022 - Present        Breast cancer ([facility] [address] ICD-10-CM: C50.919  ICD-9-CM: 174.9  7/20/2022 - Present        History of left breast cancer ICD-10-CM: Z85.3  ICD-9-CM: V10.3  5/4/2022 - Present            Discharge Condition: Good    Hospital Course: 65years old patient with past medical history of breast cancer, diabetes, heart failure, hypertension, hypothyroidism and remote history of seizures in childhood presented with a complaint of altered mental status patient has a device for pain control unable to get an MRI. Patient was seen by neurologist.  Normal EF with mild diastolic dysfunction  Made to patient's medications especially the gabapentin as patient's reaction could have been due to medications.   Patient also had an episode of hypoglycemia adjustments were made to the blood sugar medications primarily glipizide    Consults: Neurology    Significant Diagnostic Studies: labs:   Recent Results (from the past 24 hour(s))   GLUCOSE, POC    Collection Time: 09/21/22  6:09 PM   Result Value Ref Range    Glucose (POC) 130 (H) 65 - 100 mg/dL    Performed by Callum Snell    GLUCOSE, POC    Collection Time: 09/21/22  8:12 PM   Result Value Ref Range    Glucose (POC) 129 (H) 65 - 100 mg/dL    Performed by [address] [place] [address], POC    Collection Time: 09/22/22  7:14 AM   Result Value Ref Range    Glucose (POC) 101 (H) 65 - 100 mg/dL    Performed by Arnaldo Barakat    CBC WITH AUTOMATED DIFF    Collection Time: 09/22/22  7:26 AM   Result Value Ref Range    WBC 6.4 3.6 - 11.0 K/uL    RBC 3.47 (L) 3.80 - 5.20 M/uL    HGB 9.8 (L) 11.5 - 16.0 g/dL    HCT 30.1 (L) 35.0 - 47.0 %    MCV 86.7 80.0 - 99.0 FL    MCH 28.2 26.0 - 34.0 PG    MCHC 32.6 30.0 - 36.5 g/dL    RDW 16.1 (H) 11.5 - 14.5 %    PLATELET 038 638 - 974 K/uL    MPV 10.3 8.9 - 12.9 FL    NRBC 1.4 (H) 0.0  WBC    ABSOLUTE NRBC 0.09 (H) 0.00 - 0.01 K/uL    NEUTROPHILS 57 32 - 75 %    LYMPHOCYTES 26 12 - 49 %    MONOCYTES 11 5 - 13 %    EOSINOPHILS 1 0 - 7 %    BASOPHILS 2 (H) 0 - 1 %    IMMATURE GRANULOCYTES 3 (H) 0 - 0.5 %    ABS. NEUTROPHILS 3.7 1.8 - 8.0 K/UL    ABS. LYMPHOCYTES 1.7 0.8 - 3.5 K/UL    ABS. MONOCYTES 0.7 0.0 - 1.0 K/UL    ABS. EOSINOPHILS 0.0 0.0 - 0.4 K/UL    ABS. BASOPHILS 0.1 0.0 - 0.1 K/UL    ABS. IMM. GRANS. 0.2 (H) 0.00 - 0.04 K/UL    DF AUTOMATED           DUPLEX CAROTID BILATERAL   Final Result      CT HEAD WO CONT   Final Result   No acute intracranial process. Disposition: home    Discharge Medications:   Current Discharge Medication List        START taking these medications    Details   diphenhydrAMINE (BENADRYL) 50 mg capsule Take 1 Capsule by mouth nightly as needed for Sleep for up to 10 days. Qty: 30 Capsule, Refills: 0      gabapentin (NEURONTIN) 300 mg capsule Take 1 Capsule by mouth three (3) times daily. Max Daily Amount: 900 mg. Qty: 36 Capsule, Refills: 0    Associated Diagnoses: Pain      atorvastatin (LIPITOR) 20 mg tablet Take 1 Tablet by mouth nightly. Qty: 30 Tablet, Refills: 0      docusate sodium (COLACE) 100 mg capsule Take 1 Capsule by mouth two (2) times a day for 90 days. Qty: 60 Capsule, Refills: 2      famotidine (PEPCID) 20 mg tablet Take 1 Tablet by mouth every twelve (12) hours. Qty: 60 Tablet, Refills: 0      topiramate (TOPAMAX) 25 mg tablet Take 1 Tablet by mouth two (2) times daily (with meals).   Qty: 60 Tablet, Refills: 0      trimethoprim-sulfamethoxazole (BACTRIM DS, SEPTRA DS) 160-800 mg per tablet Take 1 Tablet by mouth every twelve (12) hours. Qty: 10 Tablet, Refills: 0      aspirin 81 mg cap Take 81 mg by mouth daily. Qty: 30 Tablet, Refills: 0           CONTINUE these medications which have CHANGED    Details   glipiZIDE (GLUCOTROL) 5 mg tablet Take 1 Tablet by mouth two (2) times a day. Qty: 30 Tablet, Refills: 0           CONTINUE these medications which have NOT CHANGED    Details   lisinopriL (PRINIVIL, ZESTRIL) 20 mg tablet Take 20 mg by mouth in the morning. metFORMIN (GLUCOPHAGE) 500 mg tablet Take 500 mg by mouth two (2) times daily (with meals). cholecalciferol, vitamin D3, 50 mcg (2,000 unit) tab Take 2,000 Units by mouth two (2) times a day. latanoprost (XALATAN) 0.005 % ophthalmic solution Administer 1 Drop to both eyes nightly. levothyroxine (SYNTHROID) 50 mcg tablet Take 50 mcg by mouth Daily (before breakfast). acetaminophen (TYLENOL) 650 mg TbER Take 650 mg by mouth as needed. rosuvastatin (CRESTOR) 10 mg tablet Take 10 mg by mouth nightly. aspirin 81 mg tablet Take 81 mg by mouth daily. carvediloL (COREG) 25 mg tablet Take 25 mg by mouth two (2) times daily (with meals).            STOP taking these medications       tiZANidine (ZANAFLEX) 4 mg tablet Comments:   Reason for Stopping:         SITagliptin (Januvia) 100 mg tablet Comments:   Reason for Stopping:         gabapentin (NEURONTIN) 800 mg tablet Comments:   Reason for Stopping:         spironolactone (ALDACTONE) 25 mg tablet Comments:   Reason for Stopping:         diphenhydrAMINE (BENADRYL) 25 mg tablet Comments:   Reason for Stopping:               Activity: Activity as tolerated  Diet: Cardiac Diet and Diabetic Diet  Wound Care: Keep wound clean and dry and None needed    Follow-up Appointments   Procedures    FOLLOW UP VISIT Appointment in: 3 - 5 Days     Standing Status:   Standing     Number of Occurrences:   1     Order Specific Question:   Appointment in     Answer:   3 - 5 Days     45 minutes discharge time  Signed By: Daron Johnson MD     September 22, 2022

## 2022-09-22 NOTE — PROGRESS NOTES
Problem: Falls - Risk of  Goal: *Absence of Falls  Description: Document Shailesh Cueva Fall Risk and appropriate interventions in the flowsheet.   Outcome: Progressing Towards Goal  Note: Fall Risk Interventions:  Mobility Interventions: Patient to call before getting OOB         Medication Interventions: Patient to call before getting OOB    Elimination Interventions: Bed/chair exit alarm, Call light in reach    History of Falls Interventions: Door open when patient unattended         Problem: Patient Education: Go to Patient Education Activity  Goal: Patient/Family Education  Outcome: Progressing Towards Goal

## 2022-09-23 NOTE — PROCEDURES
[facility]  EEG    Name:  Zack Thomas  MR#:  [id number]  :  1945  ACCOUNT #:  [de-identified]  DATE OF SERVICE:  2022    DESCRIPTION:  Recording is done digitally on a computer. Electrodes are placed in the 10[employer] System. Initial recording is equipment calibration followed by biocalibration. Initial montages are bipolar montage. TECHNIQUE:  Tracing started with the patient awake, eyes closed with well-formed bioccipital alpha rhythms in the 8 Hz frequency. As the recording continues, the patient is hooked up to an EKG machine that shows a heart rate of 96. Tracings continued with the patient being exposed to photic stimulation without any abnormality. Hyperventilation is not done. IMPRESSION:  This is a normal EEG, awake.       Ry Escobar MD      [id number]  D:  2022 12:12  T:  2022 15:03  JOB #:  [id number]

## 2022-09-23 NOTE — PROGRESS NOTES
Mr and Mrs Baby Pelton called back to the hospital today, not understanding her medications and how to take them. I reviewed with them the After Visit Summary to include the medication list , pages 1-9. Felt they had a clear understanding of the medication however; I feel there is possibly a cognitive underlying issue here. Also explained to them to use the pharmacist when picking up medication to help with their understanding of the medication.

## 2022-11-01 ENCOUNTER — HOSPITAL ENCOUNTER (OUTPATIENT)
Dept: RADIATION THERAPY | Age: 77
Discharge: HOME | End: 2022-11-01
Payer: MEDICARE

## 2022-11-01 PROCEDURE — 77290 THER RAD SIMULAJ FIELD CPLX: CPT

## 2022-11-09 ENCOUNTER — HOSPITAL ENCOUNTER (OUTPATIENT)
Dept: RADIATION THERAPY | Age: 77
Discharge: HOME | End: 2022-11-09
Payer: MEDICARE

## 2022-11-09 PROCEDURE — 77301 RADIOTHERAPY DOSE PLAN IMRT: CPT

## 2022-11-09 PROCEDURE — 77338 DESIGN MLC DEVICE FOR IMRT: CPT

## 2022-11-09 PROCEDURE — 77300 RADIATION THERAPY DOSE PLAN: CPT

## 2022-11-10 ENCOUNTER — HOSPITAL ENCOUNTER (OUTPATIENT)
Dept: RADIATION THERAPY | Age: 77
Discharge: HOME | End: 2022-11-10
Payer: MEDICARE

## 2022-11-10 PROCEDURE — 77385 HC IMRT TRMT DLVR SMPL: CPT

## 2022-11-11 ENCOUNTER — HOSPITAL ENCOUNTER (OUTPATIENT)
Dept: RADIATION THERAPY | Age: 77
Discharge: HOME | End: 2022-11-11
Payer: MEDICARE

## 2022-11-11 PROCEDURE — 77385 HC IMRT TRMT DLVR SMPL: CPT

## 2022-11-14 ENCOUNTER — HOSPITAL ENCOUNTER (OUTPATIENT)
Dept: RADIATION THERAPY | Age: 77
Discharge: HOME | End: 2022-11-14
Payer: MEDICARE

## 2022-11-14 PROCEDURE — 77385 HC IMRT TRMT DLVR SMPL: CPT

## 2022-11-15 ENCOUNTER — HOSPITAL ENCOUNTER (OUTPATIENT)
Dept: RADIATION THERAPY | Age: 77
Discharge: HOME | End: 2022-11-15
Payer: MEDICARE

## 2022-11-15 PROCEDURE — 77385 HC IMRT TRMT DLVR SMPL: CPT

## 2022-11-16 ENCOUNTER — HOSPITAL ENCOUNTER (OUTPATIENT)
Dept: RADIATION THERAPY | Age: 77
Discharge: HOME | End: 2022-11-16
Payer: MEDICARE

## 2022-11-16 PROCEDURE — 77385 HC IMRT TRMT DLVR SMPL: CPT

## 2022-11-16 PROCEDURE — 77336 RADIATION PHYSICS CONSULT: CPT

## 2022-11-17 ENCOUNTER — HOSPITAL ENCOUNTER (OUTPATIENT)
Dept: RADIATION THERAPY | Age: 77
Discharge: HOME | End: 2022-11-17
Payer: MEDICARE

## 2022-11-17 PROCEDURE — 77385 HC IMRT TRMT DLVR SMPL: CPT

## 2022-11-18 ENCOUNTER — HOSPITAL ENCOUNTER (OUTPATIENT)
Dept: RADIATION THERAPY | Age: 77
Discharge: HOME | End: 2022-11-18
Payer: MEDICARE

## 2022-11-18 PROCEDURE — 77385 HC IMRT TRMT DLVR SMPL: CPT

## 2022-11-21 ENCOUNTER — HOSPITAL ENCOUNTER (OUTPATIENT)
Dept: RADIATION THERAPY | Age: 77
Discharge: HOME | End: 2022-11-21
Payer: MEDICARE

## 2022-11-21 PROCEDURE — 77385 HC IMRT TRMT DLVR SMPL: CPT

## 2022-11-22 ENCOUNTER — HOSPITAL ENCOUNTER (OUTPATIENT)
Dept: RADIATION THERAPY | Age: 77
Discharge: HOME | End: 2022-11-22
Payer: MEDICARE

## 2022-11-22 PROCEDURE — 77385 HC IMRT TRMT DLVR SMPL: CPT

## 2022-11-23 ENCOUNTER — HOSPITAL ENCOUNTER (OUTPATIENT)
Dept: RADIATION THERAPY | Age: 77
Discharge: HOME | End: 2022-11-23
Payer: MEDICARE

## 2022-11-23 PROCEDURE — 77385 HC IMRT TRMT DLVR SMPL: CPT

## 2022-11-23 PROCEDURE — 77336 RADIATION PHYSICS CONSULT: CPT

## 2022-11-28 ENCOUNTER — HOSPITAL ENCOUNTER (OUTPATIENT)
Dept: RADIATION THERAPY | Age: 77
End: 2022-11-28
Payer: MEDICARE

## 2022-11-28 PROCEDURE — 77385 HC IMRT TRMT DLVR SMPL: CPT

## 2022-11-29 ENCOUNTER — HOSPITAL ENCOUNTER (OUTPATIENT)
Dept: RADIATION THERAPY | Age: 77
End: 2022-11-29
Payer: MEDICARE

## 2022-11-29 PROCEDURE — 77385 HC IMRT TRMT DLVR SMPL: CPT

## 2022-11-30 ENCOUNTER — HOSPITAL ENCOUNTER (OUTPATIENT)
Dept: RADIATION THERAPY | Age: 77
End: 2022-11-30
Payer: MEDICARE

## 2022-11-30 PROCEDURE — 77385 HC IMRT TRMT DLVR SMPL: CPT

## 2022-12-01 ENCOUNTER — HOSPITAL ENCOUNTER (OUTPATIENT)
Dept: RADIATION THERAPY | Age: 77
Discharge: HOME | End: 2022-12-01
Payer: MEDICARE

## 2022-12-01 PROCEDURE — 77385 HC IMRT TRMT DLVR SMPL: CPT

## 2022-12-02 ENCOUNTER — HOSPITAL ENCOUNTER (OUTPATIENT)
Dept: RADIATION THERAPY | Age: 77
Discharge: HOME | End: 2022-12-02
Payer: MEDICARE

## 2022-12-02 PROCEDURE — 77336 RADIATION PHYSICS CONSULT: CPT

## 2022-12-02 PROCEDURE — 77385 HC IMRT TRMT DLVR SMPL: CPT

## 2022-12-05 ENCOUNTER — HOSPITAL ENCOUNTER (OUTPATIENT)
Dept: RADIATION THERAPY | Age: 77
Discharge: HOME | End: 2022-12-05
Payer: MEDICARE

## 2022-12-05 ENCOUNTER — TRANSCRIBE ORDER (OUTPATIENT)
Dept: SCHEDULING | Age: 77
End: 2022-12-05

## 2022-12-05 DIAGNOSIS — C50.012 CARCINOMA OF NIPPLE AND AREOLA OF FEMALE BREAST, LEFT (HCC): Primary | ICD-10-CM

## 2022-12-05 PROCEDURE — 77385 HC IMRT TRMT DLVR SMPL: CPT

## 2022-12-05 PROCEDURE — 77290 THER RAD SIMULAJ FIELD CPLX: CPT

## 2022-12-05 PROCEDURE — 77334 RADIATION TREATMENT AID(S): CPT

## 2022-12-05 PROCEDURE — 77307 TELETHX ISODOSE PLAN CPLX: CPT

## 2022-12-06 ENCOUNTER — HOSPITAL ENCOUNTER (OUTPATIENT)
Dept: RADIATION THERAPY | Age: 77
Discharge: HOME | End: 2022-12-06
Payer: MEDICARE

## 2022-12-06 ENCOUNTER — TRANSCRIBE ORDER (OUTPATIENT)
Dept: SCHEDULING | Age: 77
End: 2022-12-06

## 2022-12-06 DIAGNOSIS — C50.012 CARCINOMA OF NIPPLE AND AREOLA OF FEMALE BREAST, LEFT (HCC): Primary | ICD-10-CM

## 2022-12-06 DIAGNOSIS — Z13.820 OSTEOPOROSIS SCREENING: ICD-10-CM

## 2022-12-06 PROCEDURE — 77385 HC IMRT TRMT DLVR SMPL: CPT

## 2022-12-07 ENCOUNTER — HOSPITAL ENCOUNTER (OUTPATIENT)
Dept: RADIATION THERAPY | Age: 77
Discharge: HOME | End: 2022-12-07
Payer: MEDICARE

## 2022-12-07 PROCEDURE — 77385 HC IMRT TRMT DLVR SMPL: CPT

## 2022-12-08 ENCOUNTER — HOSPITAL ENCOUNTER (OUTPATIENT)
Dept: RADIATION THERAPY | Age: 77
Discharge: HOME | End: 2022-12-08
Payer: MEDICARE

## 2022-12-08 PROCEDURE — 77385 HC IMRT TRMT DLVR SMPL: CPT

## 2022-12-09 ENCOUNTER — HOSPITAL ENCOUNTER (OUTPATIENT)
Dept: RADIATION THERAPY | Age: 77
Discharge: HOME | End: 2022-12-09
Payer: MEDICARE

## 2022-12-09 PROCEDURE — 77385 HC IMRT TRMT DLVR SMPL: CPT

## 2022-12-09 PROCEDURE — 77336 RADIATION PHYSICS CONSULT: CPT

## 2022-12-10 ENCOUNTER — TRANSCRIBE ORDER (OUTPATIENT)
Dept: SCHEDULING | Age: 77
End: 2022-12-10

## 2022-12-10 DIAGNOSIS — Z13.820 SPECIAL SCREENING FOR OSTEOPOROSIS: Primary | ICD-10-CM

## 2022-12-12 ENCOUNTER — HOSPITAL ENCOUNTER (OUTPATIENT)
Dept: RADIATION THERAPY | Age: 77
Discharge: HOME | End: 2022-12-12
Payer: MEDICARE

## 2022-12-12 ENCOUNTER — TRANSCRIBE ORDER (OUTPATIENT)
Dept: SCHEDULING | Age: 77
End: 2022-12-12

## 2022-12-12 DIAGNOSIS — Z85.3 HISTORY OF BREAST CANCER: Primary | ICD-10-CM

## 2022-12-12 PROCEDURE — 77385 HC IMRT TRMT DLVR SMPL: CPT

## 2022-12-13 ENCOUNTER — HOSPITAL ENCOUNTER (OUTPATIENT)
Dept: RADIATION THERAPY | Age: 77
Discharge: HOME | End: 2022-12-13
Payer: MEDICARE

## 2022-12-13 PROCEDURE — 77385 HC IMRT TRMT DLVR SMPL: CPT

## 2022-12-14 ENCOUNTER — HOSPITAL ENCOUNTER (OUTPATIENT)
Dept: RADIATION THERAPY | Age: 77
Discharge: HOME | End: 2022-12-14
Payer: MEDICARE

## 2022-12-14 ENCOUNTER — TRANSCRIBE ORDER (OUTPATIENT)
Dept: SCHEDULING | Age: 77
End: 2022-12-14

## 2022-12-14 DIAGNOSIS — Z13.820 SCREENING FOR OSTEOPOROSIS: Primary | ICD-10-CM

## 2022-12-14 DIAGNOSIS — Z78.0 POST-MENOPAUSAL: ICD-10-CM

## 2022-12-14 DIAGNOSIS — C50.012 CARCINOMA OF NIPPLE AND AREOLA OF FEMALE BREAST, LEFT (HCC): ICD-10-CM

## 2022-12-14 PROCEDURE — 77385 HC IMRT TRMT DLVR SMPL: CPT

## 2022-12-15 ENCOUNTER — HOSPITAL ENCOUNTER (OUTPATIENT)
Dept: RADIATION THERAPY | Age: 77
Discharge: HOME | End: 2022-12-15
Payer: MEDICARE

## 2022-12-15 PROCEDURE — 77385 HC IMRT TRMT DLVR SMPL: CPT

## 2022-12-16 ENCOUNTER — HOSPITAL ENCOUNTER (OUTPATIENT)
Dept: RADIATION THERAPY | Age: 77
Discharge: HOME | End: 2022-12-16
Payer: MEDICARE

## 2022-12-16 PROCEDURE — 77336 RADIATION PHYSICS CONSULT: CPT

## 2022-12-16 PROCEDURE — 77385 HC IMRT TRMT DLVR SMPL: CPT

## 2022-12-19 ENCOUNTER — HOSPITAL ENCOUNTER (OUTPATIENT)
Dept: RADIATION THERAPY | Age: 77
Discharge: HOME | End: 2022-12-19
Payer: MEDICARE

## 2022-12-19 PROCEDURE — 77412 RADIATION TX DELIVERY LVL 3: CPT

## 2022-12-19 PROCEDURE — 77417 THER RADIOLOGY PORT IMAGE(S): CPT

## 2022-12-20 ENCOUNTER — HOSPITAL ENCOUNTER (OUTPATIENT)
Dept: RADIATION THERAPY | Age: 77
Discharge: HOME | End: 2022-12-20
Payer: MEDICARE

## 2022-12-20 PROCEDURE — 77412 RADIATION TX DELIVERY LVL 3: CPT

## 2022-12-21 ENCOUNTER — HOSPITAL ENCOUNTER (OUTPATIENT)
Dept: RADIATION THERAPY | Age: 77
Discharge: HOME | End: 2022-12-21
Payer: MEDICARE

## 2022-12-21 PROCEDURE — 77412 RADIATION TX DELIVERY LVL 3: CPT

## 2022-12-22 ENCOUNTER — HOSPITAL ENCOUNTER (OUTPATIENT)
Dept: RADIATION THERAPY | Age: 77
Discharge: HOME | End: 2022-12-22
Payer: MEDICARE

## 2022-12-22 PROCEDURE — 77412 RADIATION TX DELIVERY LVL 3: CPT

## 2022-12-23 ENCOUNTER — HOSPITAL ENCOUNTER (OUTPATIENT)
Dept: RADIATION THERAPY | Age: 77
Discharge: HOME | End: 2022-12-23
Payer: MEDICARE

## 2022-12-23 PROCEDURE — 77412 RADIATION TX DELIVERY LVL 3: CPT

## 2022-12-23 PROCEDURE — 77336 RADIATION PHYSICS CONSULT: CPT

## 2023-02-24 ENCOUNTER — HOSPITAL ENCOUNTER (OUTPATIENT)
Dept: MAMMOGRAPHY | Age: 78
Discharge: HOME | End: 2023-02-24
Attending: INTERNAL MEDICINE
Payer: MEDICARE

## 2023-02-24 DIAGNOSIS — Z13.820 SCREENING FOR OSTEOPOROSIS: ICD-10-CM

## 2023-02-24 DIAGNOSIS — C50.012 CARCINOMA OF NIPPLE AND AREOLA OF FEMALE BREAST, LEFT (HCC): ICD-10-CM

## 2023-02-24 DIAGNOSIS — Z78.0 POST-MENOPAUSAL: ICD-10-CM

## 2023-02-24 PROCEDURE — 77080 DXA BONE DENSITY AXIAL: CPT

## 2023-04-22 DIAGNOSIS — Z85.3 HISTORY OF BREAST CANCER: Primary | ICD-10-CM

## 2023-07-06 ENCOUNTER — HOSPITAL ENCOUNTER (OUTPATIENT)
Facility: HOSPITAL | Age: 78
Discharge: HOME | End: 2023-07-06
Attending: SURGERY
Payer: MEDICARE

## 2023-07-06 DIAGNOSIS — Z85.3 HISTORY OF BREAST CANCER: ICD-10-CM

## 2023-07-06 PROCEDURE — G0279 TOMOSYNTHESIS, MAMMO: HCPCS

## 2023-08-04 RX ORDER — AMLODIPINE BESYLATE 5 MG/1
5 TABLET ORAL DAILY
COMMUNITY

## 2023-08-04 RX ORDER — SPIRONOLACTONE 25 MG/1
25 TABLET ORAL DAILY
COMMUNITY

## 2023-08-04 RX ORDER — LETROZOLE 2.5 MG/1
2.5 TABLET, FILM COATED ORAL DAILY
COMMUNITY

## 2023-08-04 RX ORDER — GLIMEPIRIDE 4 MG/1
4 TABLET ORAL 2 TIMES DAILY
COMMUNITY

## 2023-08-09 ENCOUNTER — ANESTHESIA (OUTPATIENT)
Facility: HOSPITAL | Age: 78
End: 2023-08-09
Payer: MEDICARE

## 2023-08-09 ENCOUNTER — HOSPITAL ENCOUNTER (OUTPATIENT)
Facility: HOSPITAL | Age: 78
Discharge: HOME | End: 2023-08-09
Attending: SURGERY | Admitting: SURGERY
Payer: MEDICARE

## 2023-08-09 ENCOUNTER — ANESTHESIA EVENT (OUTPATIENT)
Facility: HOSPITAL | Age: 78
End: 2023-08-09
Payer: MEDICARE

## 2023-08-09 VITALS
BODY MASS INDEX: 24.29 KG/M2 | HEIGHT: 62 IN | HEART RATE: 58 BPM | SYSTOLIC BLOOD PRESSURE: 127 MMHG | DIASTOLIC BLOOD PRESSURE: 64 MMHG | OXYGEN SATURATION: 98 % | TEMPERATURE: 97.6 F | WEIGHT: 132 LBS | RESPIRATION RATE: 18 BRPM

## 2023-08-09 DIAGNOSIS — C50.919 MALIGNANT NEOPLASM OF FEMALE BREAST, UNSPECIFIED ESTROGEN RECEPTOR STATUS, UNSPECIFIED LATERALITY, UNSPECIFIED SITE OF BREAST (HCC): ICD-10-CM

## 2023-08-09 PROBLEM — C50.012: Status: ACTIVE | Noted: 2023-08-09

## 2023-08-09 LAB
ANION GAP BLD CALC-SCNC: 12
CA-I BLD-MCNC: 1.32 MMOL/L (ref 1.12–1.32)
CHLORIDE BLD-SCNC: 103 MMOL/L (ref 98–107)
CO2 BLD-SCNC: 26 MMOL/L
CREAT UR-MCNC: 0.91 MG/DL (ref 0.6–1.3)
GLUCOSE BLD STRIP.AUTO-MCNC: 180 MG/DL (ref 65–100)
POTASSIUM BLD-SCNC: 4.1 MMOL/L (ref 3.5–5.5)
SODIUM BLD-SCNC: 140 MMOL/L (ref 136–145)

## 2023-08-09 PROCEDURE — 3700000000 HC ANESTHESIA ATTENDED CARE: Performed by: SURGERY

## 2023-08-09 PROCEDURE — 7100000010 HC PHASE II RECOVERY - FIRST 15 MIN: Performed by: SURGERY

## 2023-08-09 PROCEDURE — 2580000003 HC RX 258: Performed by: SURGERY

## 2023-08-09 PROCEDURE — 80047 BASIC METABLC PNL IONIZED CA: CPT

## 2023-08-09 PROCEDURE — 2500000003 HC RX 250 WO HCPCS: Performed by: NURSE ANESTHETIST, CERTIFIED REGISTERED

## 2023-08-09 PROCEDURE — 3600000002 HC SURGERY LEVEL 2 BASE: Performed by: SURGERY

## 2023-08-09 PROCEDURE — 3600000012 HC SURGERY LEVEL 2 ADDTL 15MIN: Performed by: SURGERY

## 2023-08-09 PROCEDURE — 2709999900 HC NON-CHARGEABLE SUPPLY: Performed by: SURGERY

## 2023-08-09 PROCEDURE — 6360000002 HC RX W HCPCS: Performed by: NURSE ANESTHETIST, CERTIFIED REGISTERED

## 2023-08-09 PROCEDURE — 2500000003 HC RX 250 WO HCPCS: Performed by: SURGERY

## 2023-08-09 PROCEDURE — 7100000001 HC PACU RECOVERY - ADDTL 15 MIN: Performed by: SURGERY

## 2023-08-09 PROCEDURE — 7100000011 HC PHASE II RECOVERY - ADDTL 15 MIN: Performed by: SURGERY

## 2023-08-09 PROCEDURE — 7100000000 HC PACU RECOVERY - FIRST 15 MIN: Performed by: SURGERY

## 2023-08-09 PROCEDURE — 3700000001 HC ADD 15 MINUTES (ANESTHESIA): Performed by: SURGERY

## 2023-08-09 RX ORDER — HYDRALAZINE HYDROCHLORIDE 20 MG/ML
10 INJECTION INTRAMUSCULAR; INTRAVENOUS
Status: DISCONTINUED | OUTPATIENT
Start: 2023-08-09 | End: 2023-08-09 | Stop reason: HOSPADM

## 2023-08-09 RX ORDER — MIDAZOLAM HYDROCHLORIDE 1 MG/ML
INJECTION INTRAMUSCULAR; INTRAVENOUS PRN
Status: DISCONTINUED | OUTPATIENT
Start: 2023-08-09 | End: 2023-08-09 | Stop reason: SDUPTHER

## 2023-08-09 RX ORDER — FENTANYL CITRATE 50 UG/ML
INJECTION, SOLUTION INTRAMUSCULAR; INTRAVENOUS PRN
Status: DISCONTINUED | OUTPATIENT
Start: 2023-08-09 | End: 2023-08-09 | Stop reason: SDUPTHER

## 2023-08-09 RX ORDER — FENTANYL CITRATE 50 UG/ML
25 INJECTION, SOLUTION INTRAMUSCULAR; INTRAVENOUS EVERY 5 MIN PRN
Status: DISCONTINUED | OUTPATIENT
Start: 2023-08-09 | End: 2023-08-09 | Stop reason: HOSPADM

## 2023-08-09 RX ORDER — IPRATROPIUM BROMIDE AND ALBUTEROL SULFATE 2.5; .5 MG/3ML; MG/3ML
1 SOLUTION RESPIRATORY (INHALATION)
Status: DISCONTINUED | OUTPATIENT
Start: 2023-08-09 | End: 2023-08-09 | Stop reason: HOSPADM

## 2023-08-09 RX ORDER — HYDROMORPHONE HYDROCHLORIDE 1 MG/ML
0.25 INJECTION, SOLUTION INTRAMUSCULAR; INTRAVENOUS; SUBCUTANEOUS EVERY 5 MIN PRN
Status: DISCONTINUED | OUTPATIENT
Start: 2023-08-09 | End: 2023-08-09 | Stop reason: HOSPADM

## 2023-08-09 RX ORDER — OXYCODONE HYDROCHLORIDE 5 MG/1
5 TABLET ORAL PRN
Status: DISCONTINUED | OUTPATIENT
Start: 2023-08-09 | End: 2023-08-09 | Stop reason: HOSPADM

## 2023-08-09 RX ORDER — SODIUM CHLORIDE 9 MG/ML
INJECTION, SOLUTION INTRAVENOUS PRN
Status: DISCONTINUED | OUTPATIENT
Start: 2023-08-09 | End: 2023-08-09 | Stop reason: HOSPADM

## 2023-08-09 RX ORDER — SODIUM CHLORIDE, SODIUM LACTATE, POTASSIUM CHLORIDE, CALCIUM CHLORIDE 600; 310; 30; 20 MG/100ML; MG/100ML; MG/100ML; MG/100ML
INJECTION, SOLUTION INTRAVENOUS ONCE
Status: DISCONTINUED | OUTPATIENT
Start: 2023-08-09 | End: 2023-08-09 | Stop reason: HOSPADM

## 2023-08-09 RX ORDER — LIDOCAINE HYDROCHLORIDE 20 MG/ML
INJECTION, SOLUTION EPIDURAL; INFILTRATION; INTRACAUDAL; PERINEURAL PRN
Status: DISCONTINUED | OUTPATIENT
Start: 2023-08-09 | End: 2023-08-09 | Stop reason: SDUPTHER

## 2023-08-09 RX ORDER — SODIUM CHLORIDE 0.9 % (FLUSH) 0.9 %
5-40 SYRINGE (ML) INJECTION PRN
Status: DISCONTINUED | OUTPATIENT
Start: 2023-08-09 | End: 2023-08-09 | Stop reason: HOSPADM

## 2023-08-09 RX ORDER — LABETALOL HYDROCHLORIDE 5 MG/ML
10 INJECTION, SOLUTION INTRAVENOUS
Status: DISCONTINUED | OUTPATIENT
Start: 2023-08-09 | End: 2023-08-09 | Stop reason: HOSPADM

## 2023-08-09 RX ORDER — ONDANSETRON 2 MG/ML
4 INJECTION INTRAMUSCULAR; INTRAVENOUS
Status: DISCONTINUED | OUTPATIENT
Start: 2023-08-09 | End: 2023-08-09 | Stop reason: HOSPADM

## 2023-08-09 RX ORDER — SODIUM CHLORIDE 0.9 % (FLUSH) 0.9 %
5-40 SYRINGE (ML) INJECTION EVERY 12 HOURS SCHEDULED
Status: DISCONTINUED | OUTPATIENT
Start: 2023-08-09 | End: 2023-08-09 | Stop reason: HOSPADM

## 2023-08-09 RX ORDER — OXYCODONE HYDROCHLORIDE 5 MG/1
10 TABLET ORAL PRN
Status: DISCONTINUED | OUTPATIENT
Start: 2023-08-09 | End: 2023-08-09 | Stop reason: HOSPADM

## 2023-08-09 RX ORDER — DIPHENHYDRAMINE HYDROCHLORIDE 50 MG/ML
12.5 INJECTION INTRAMUSCULAR; INTRAVENOUS
Status: DISCONTINUED | OUTPATIENT
Start: 2023-08-09 | End: 2023-08-09 | Stop reason: HOSPADM

## 2023-08-09 RX ORDER — LORAZEPAM 2 MG/ML
0.5 INJECTION INTRAMUSCULAR
Status: DISCONTINUED | OUTPATIENT
Start: 2023-08-09 | End: 2023-08-09 | Stop reason: HOSPADM

## 2023-08-09 RX ORDER — LIDOCAINE HYDROCHLORIDE AND EPINEPHRINE BITARTRATE 20; .01 MG/ML; MG/ML
INJECTION, SOLUTION SUBCUTANEOUS PRN
Status: DISCONTINUED | OUTPATIENT
Start: 2023-08-09 | End: 2023-08-09 | Stop reason: ALTCHOICE

## 2023-08-09 RX ORDER — SODIUM CHLORIDE, SODIUM LACTATE, POTASSIUM CHLORIDE, CALCIUM CHLORIDE 600; 310; 30; 20 MG/100ML; MG/100ML; MG/100ML; MG/100ML
INJECTION, SOLUTION INTRAVENOUS CONTINUOUS
Status: DISCONTINUED | OUTPATIENT
Start: 2023-08-09 | End: 2023-08-09 | Stop reason: HOSPADM

## 2023-08-09 RX ORDER — EPHEDRINE SULFATE 50 MG/ML
INJECTION INTRAVENOUS PRN
Status: DISCONTINUED | OUTPATIENT
Start: 2023-08-09 | End: 2023-08-09 | Stop reason: SDUPTHER

## 2023-08-09 RX ADMIN — FENTANYL CITRATE 50 MCG: 50 INJECTION, SOLUTION INTRAMUSCULAR; INTRAVENOUS at 10:21

## 2023-08-09 RX ADMIN — LIDOCAINE HYDROCHLORIDE 60 MG: 20 INJECTION, SOLUTION EPIDURAL; INFILTRATION; INTRACAUDAL; PERINEURAL at 10:21

## 2023-08-09 RX ADMIN — MIDAZOLAM HYDROCHLORIDE 1 MG: 2 INJECTION, SOLUTION INTRAMUSCULAR; INTRAVENOUS at 10:14

## 2023-08-09 RX ADMIN — SODIUM CHLORIDE, POTASSIUM CHLORIDE, SODIUM LACTATE AND CALCIUM CHLORIDE: 600; 310; 30; 20 INJECTION, SOLUTION INTRAVENOUS at 08:02

## 2023-08-09 RX ADMIN — PROPOFOL 20 MG: 10 INJECTION, EMULSION INTRAVENOUS at 10:27

## 2023-08-09 RX ADMIN — EPHEDRINE SULFATE 10 MG: 50 INJECTION INTRAVENOUS at 10:36

## 2023-08-09 RX ADMIN — EPHEDRINE SULFATE 10 MG: 50 INJECTION INTRAVENOUS at 10:29

## 2023-08-09 RX ADMIN — PROPOFOL 30 MG: 10 INJECTION, EMULSION INTRAVENOUS at 10:30

## 2023-08-09 RX ADMIN — PROPOFOL 50 MG: 10 INJECTION, EMULSION INTRAVENOUS at 10:21

## 2023-08-09 RX ADMIN — PROPOFOL 30 MG: 10 INJECTION, EMULSION INTRAVENOUS at 10:24

## 2023-08-09 RX ADMIN — EPHEDRINE SULFATE 10 MG: 50 INJECTION INTRAVENOUS at 10:40

## 2023-08-09 RX ADMIN — PROPOFOL 20 MG: 10 INJECTION, EMULSION INTRAVENOUS at 10:33

## 2023-08-09 ASSESSMENT — PAIN SCALES - GENERAL
PAINLEVEL_OUTOF10: 0

## 2023-08-09 ASSESSMENT — PAIN - FUNCTIONAL ASSESSMENT: PAIN_FUNCTIONAL_ASSESSMENT: 0-10

## 2023-08-09 NOTE — OP NOTE
Operative Note      Patient: Beti Mcgee  YOB: 1945  MRN: [id number]    Date of Procedure: 8/9/2023    Pre-Op Diagnosis Codes:     * Malignant neoplasm of female breast, unspecified estrogen receptor status, unspecified laterality, unspecified site of breast ([address]) [C50.919]    Post-Op Diagnosis: Same       Procedure(s):  PORT REMOVAL    Surgeon(s):  Dylon Haynes MD    Assistant:   none    Anesthesia: Monitor Anesthesia Care    Estimated Blood Loss (mL): Minimal    Complications: None    Specimens:   ID Type Source Tests Collected by Time Destination   A : right chest port Hardware Chest SURGICAL PATHOLOGY Dylon Haynes MD 8/9/2023 1042        Implants:  * No implants in log *      Drains: * No LDAs found *    Findings: port removed in its entirety        Detailed Description of Procedure: Indications: Dodie Rueda is a 77year old woman who presented for mediport removal. Prior to the procedure the patient expressed understanding of the risks of bleeding, infection, need for further procedures and air embolism. Procedure: The patient was taken back to the OR and placed on the operating room table in the supine position. After induction of sedation, the right neck and chest were prepped in the usual sterile fashion. A time out was performed according to standard institutional protocol. The patient was placed in trendelenberg position. Local anesthetic was infiltrated into the chest. The prior scar was incised and the port was dissected out. I held pressure at the right IJ and removed the catheter. The catheter was inspected and complete removal was confirmed. The wound bed was irrigated. The incision was closed with 3-0 vicryl stitched in the deep dermis and and the skin was closed with a running 4-0 monocryl stitch placed in a subcuticular fashion. Steri strips were applied. The patient was woken up and transported to the PACU in stable condition.  I was present for

## 2023-08-09 NOTE — BRIEF OP NOTE
Brief Postoperative Note      Patient: Mic Tompkins  YOB: 1945  MRN: [id number]    Date of Procedure: 8/9/2023    Pre-Op Diagnosis Codes:     * Malignant neoplasm of female breast, unspecified estrogen receptor status, unspecified laterality, unspecified site of breast ([address]) [C50.919]    Post-Op Diagnosis: Same       Procedure(s):  PORT REMOVAL    Surgeon(s):  Veronika Hathaway MD    Assistant:  Surgical Assistant: Torrie Wang    Anesthesia: Monitor Anesthesia Care    Estimated Blood Loss (mL): Minimal    Complications: None    Specimens:   ID Type Source Tests Collected by Time Destination   A : right chest port Hardware Chest SURGICAL PATHOLOGY Veronika Hathaway MD 8/9/2023 1042        Implants:  * No implants in log *      Drains: * No LDAs found *    Findings: port removed in its entirety      Electronically signed by Veronika Hathaway MD on 8/9/2023 at 10:44 AM

## 2023-08-14 NOTE — ANESTHESIA POSTPROCEDURE EVALUATION
Department of Anesthesiology  Postprocedure Note    Patient: Dhiraj Moreno  MRN: [id number]  YOB: 1945  Date of evaluation: 8/14/2023      Procedure Summary     Date: 08/09/23 Room / Location: [facility] MAIN OR 03 / [facility] MAIN OR    Anesthesia Start: 1014 Anesthesia Stop: 1056    Procedure: PORT REMOVAL (Chest) Diagnosis:       Malignant neoplasm of female breast, unspecified estrogen receptor status, unspecified laterality, unspecified site of breast ([address])      (Malignant neoplasm of female breast, unspecified estrogen receptor status, unspecified laterality, unspecified site of breast ([address]) [facility] [place])    Surgeons: Penny Panchal MD Responsible Provider: Britney Encarnacion MD    Anesthesia Type: MAC, TIVA ASA Status: 3          Anesthesia Type: MAC, TIVA    Blas Phase I: Blas Score: 10    Blas Phase II: Blas Score: 10      Anesthesia Post Evaluation    Patient location during evaluation: PACU  Patient participation: complete - patient participated  Level of consciousness: awake  Pain score: 0  Airway patency: patent  Nausea & Vomiting: no nausea and no vomiting  Complications: no  Cardiovascular status: hemodynamically stable  Respiratory status: acceptable  Hydration status: stable  Multimodal analgesia pain management approach

## 2024-07-07 ENCOUNTER — APPOINTMENT (OUTPATIENT)
Facility: HOSPITAL | Age: 79
DRG: 312 | End: 2024-07-07
Payer: MEDICARE

## 2024-07-07 ENCOUNTER — HOSPITAL ENCOUNTER (INPATIENT)
Facility: HOSPITAL | Age: 79
LOS: 3 days | Discharge: HOME HEALTH | DRG: 312 | End: 2024-07-10
Attending: STUDENT IN AN ORGANIZED HEALTH CARE EDUCATION/TRAINING PROGRAM | Admitting: INTERNAL MEDICINE
Payer: MEDICARE

## 2024-07-07 DIAGNOSIS — R55 SYNCOPE AND COLLAPSE: Primary | ICD-10-CM

## 2024-07-07 LAB
ALBUMIN SERPL-MCNC: 3.4 G/DL (ref 3.5–5)
ALBUMIN/GLOB SERPL: 1.1 (ref 1.1–2.2)
ALP SERPL-CCNC: 48 U/L (ref 45–117)
ALT SERPL-CCNC: 22 U/L (ref 12–78)
ANION GAP SERPL CALC-SCNC: 7 MMOL/L (ref 5–15)
APPEARANCE UR: CLEAR
AST SERPL W P-5'-P-CCNC: 12 U/L (ref 15–37)
BACTERIA URNS QL MICRO: NEGATIVE /HPF
BASOPHILS # BLD: 0.1 K/UL (ref 0–0.1)
BASOPHILS NFR BLD: 1 % (ref 0–1)
BILIRUB SERPL-MCNC: 0.5 MG/DL (ref 0.2–1)
BILIRUB UR QL: NEGATIVE
BUN SERPL-MCNC: 9 MG/DL (ref 6–20)
BUN/CREAT SERPL: 10 (ref 12–20)
CA-I BLD-MCNC: 9.7 MG/DL (ref 8.5–10.1)
CHLORIDE SERPL-SCNC: 109 MMOL/L (ref 97–108)
CO2 SERPL-SCNC: 25 MMOL/L (ref 21–32)
COLOR UR: ABNORMAL
CREAT SERPL-MCNC: 0.92 MG/DL (ref 0.55–1.02)
DIFFERENTIAL METHOD BLD: ABNORMAL
EKG ATRIAL RATE: 55 BPM
EKG DIAGNOSIS: NORMAL
EKG P AXIS: 10 DEGREES
EKG P-R INTERVAL: 176 MS
EKG Q-T INTERVAL: 414 MS
EKG QRS DURATION: 82 MS
EKG QTC CALCULATION (BAZETT): 396 MS
EKG R AXIS: 20 DEGREES
EKG T AXIS: 55 DEGREES
EKG VENTRICULAR RATE: 55 BPM
EOSINOPHIL # BLD: 0.1 K/UL (ref 0–0.4)
EOSINOPHIL NFR BLD: 3 % (ref 0–7)
EPITH CASTS URNS QL MICRO: ABNORMAL /LPF
ERYTHROCYTE [DISTWIDTH] IN BLOOD BY AUTOMATED COUNT: 13.5 % (ref 11.5–14.5)
GLOBULIN SER CALC-MCNC: 3.1 G/DL (ref 2–4)
GLUCOSE BLD STRIP.AUTO-MCNC: 150 MG/DL (ref 65–100)
GLUCOSE BLD STRIP.AUTO-MCNC: 189 MG/DL (ref 65–100)
GLUCOSE BLD STRIP.AUTO-MCNC: 45 MG/DL (ref 65–100)
GLUCOSE BLD STRIP.AUTO-MCNC: 47 MG/DL (ref 65–100)
GLUCOSE SERPL-MCNC: 223 MG/DL (ref 65–100)
GLUCOSE UR STRIP.AUTO-MCNC: 50 MG/DL
HCT VFR BLD AUTO: 34.3 % (ref 35–47)
HGB BLD-MCNC: 11.2 G/DL (ref 11.5–16)
HGB UR QL STRIP: NEGATIVE
HYALINE CASTS URNS QL MICRO: ABNORMAL /LPF (ref 0–5)
IMM GRANULOCYTES # BLD AUTO: 0 K/UL (ref 0–0.04)
IMM GRANULOCYTES NFR BLD AUTO: 1 % (ref 0–0.5)
KETONES UR QL STRIP.AUTO: 20 MG/DL
LEUKOCYTE ESTERASE UR QL STRIP.AUTO: NEGATIVE
LYMPHOCYTES # BLD: 0.9 K/UL (ref 0.8–3.5)
LYMPHOCYTES NFR BLD: 18 % (ref 12–49)
MAGNESIUM SERPL-MCNC: 1.6 MG/DL (ref 1.6–2.4)
MCH RBC QN AUTO: 28.3 PG (ref 26–34)
MCHC RBC AUTO-ENTMCNC: 32.7 G/DL (ref 30–36.5)
MCV RBC AUTO: 86.6 FL (ref 80–99)
MONOCYTES # BLD: 0.3 K/UL (ref 0–1)
MONOCYTES NFR BLD: 7 % (ref 5–13)
MUCOUS THREADS URNS QL MICRO: ABNORMAL /LPF
NEUTS SEG # BLD: 3.4 K/UL (ref 1.8–8)
NEUTS SEG NFR BLD: 70 % (ref 32–75)
NITRITE UR QL STRIP.AUTO: NEGATIVE
NRBC # BLD: 0 K/UL (ref 0–0.01)
NRBC BLD-RTO: 0 PER 100 WBC
PERFORMED BY:: ABNORMAL
PH UR STRIP: 5 (ref 5–8)
PLATELET # BLD AUTO: 203 K/UL (ref 150–400)
PMV BLD AUTO: 9.7 FL (ref 8.9–12.9)
POTASSIUM SERPL-SCNC: 4 MMOL/L (ref 3.5–5.1)
PROT SERPL-MCNC: 6.5 G/DL (ref 6.4–8.2)
PROT UR STRIP-MCNC: NEGATIVE MG/DL
RBC # BLD AUTO: 3.96 M/UL (ref 3.8–5.2)
RBC #/AREA URNS HPF: ABNORMAL /HPF (ref 0–5)
SODIUM SERPL-SCNC: 141 MMOL/L (ref 136–145)
SP GR UR REFRACTOMETRY: 1.01 (ref 1–1.03)
TROPONIN I SERPL HS-MCNC: 15 NG/L (ref 0–51)
TROPONIN I SERPL HS-MCNC: 16 NG/L (ref 0–51)
TROPONIN I SERPL HS-MCNC: 19 NG/L (ref 0–51)
TSH SERPL DL<=0.05 MIU/L-ACNC: 0.39 UIU/ML (ref 0.36–3.74)
URINE CULTURE IF INDICATED: ABNORMAL
UROBILINOGEN UR QL STRIP.AUTO: 0.1 EU/DL (ref 0.1–1)
WBC # BLD AUTO: 4.9 K/UL (ref 3.6–11)
WBC URNS QL MICRO: ABNORMAL /HPF (ref 0–4)

## 2024-07-07 PROCEDURE — G0378 HOSPITAL OBSERVATION PER HR: HCPCS

## 2024-07-07 PROCEDURE — 80053 COMPREHEN METABOLIC PANEL: CPT

## 2024-07-07 PROCEDURE — 83735 ASSAY OF MAGNESIUM: CPT

## 2024-07-07 PROCEDURE — 2580000003 HC RX 258: Performed by: STUDENT IN AN ORGANIZED HEALTH CARE EDUCATION/TRAINING PROGRAM

## 2024-07-07 PROCEDURE — 83036 HEMOGLOBIN GLYCOSYLATED A1C: CPT

## 2024-07-07 PROCEDURE — 84443 ASSAY THYROID STIM HORMONE: CPT

## 2024-07-07 PROCEDURE — 96360 HYDRATION IV INFUSION INIT: CPT

## 2024-07-07 PROCEDURE — 1100000000 HC RM PRIVATE

## 2024-07-07 PROCEDURE — 99285 EMERGENCY DEPT VISIT HI MDM: CPT

## 2024-07-07 PROCEDURE — 71045 X-RAY EXAM CHEST 1 VIEW: CPT

## 2024-07-07 PROCEDURE — 70450 CT HEAD/BRAIN W/O DYE: CPT

## 2024-07-07 PROCEDURE — 93005 ELECTROCARDIOGRAM TRACING: CPT | Performed by: EMERGENCY MEDICINE

## 2024-07-07 PROCEDURE — 84484 ASSAY OF TROPONIN QUANT: CPT

## 2024-07-07 PROCEDURE — 2580000003 HC RX 258: Performed by: INTERNAL MEDICINE

## 2024-07-07 PROCEDURE — 85025 COMPLETE CBC W/AUTO DIFF WBC: CPT

## 2024-07-07 PROCEDURE — 6370000000 HC RX 637 (ALT 250 FOR IP): Performed by: INTERNAL MEDICINE

## 2024-07-07 PROCEDURE — 96361 HYDRATE IV INFUSION ADD-ON: CPT

## 2024-07-07 PROCEDURE — 94761 N-INVAS EAR/PLS OXIMETRY MLT: CPT

## 2024-07-07 PROCEDURE — 81001 URINALYSIS AUTO W/SCOPE: CPT

## 2024-07-07 PROCEDURE — 36415 COLL VENOUS BLD VENIPUNCTURE: CPT

## 2024-07-07 PROCEDURE — 82962 GLUCOSE BLOOD TEST: CPT

## 2024-07-07 RX ORDER — GLIPIZIDE 5 MG/1
5 TABLET ORAL
Status: DISCONTINUED | OUTPATIENT
Start: 2024-07-08 | End: 2024-07-10 | Stop reason: HOSPADM

## 2024-07-07 RX ORDER — INSULIN GLARGINE 100 [IU]/ML
0.15 INJECTION, SOLUTION SUBCUTANEOUS NIGHTLY
Status: DISCONTINUED | OUTPATIENT
Start: 2024-07-07 | End: 2024-07-10

## 2024-07-07 RX ORDER — POLYETHYLENE GLYCOL 3350 17 G/17G
17 POWDER, FOR SOLUTION ORAL DAILY PRN
Status: DISCONTINUED | OUTPATIENT
Start: 2024-07-07 | End: 2024-07-10 | Stop reason: HOSPADM

## 2024-07-07 RX ORDER — CARVEDILOL 12.5 MG/1
25 TABLET ORAL 2 TIMES DAILY WITH MEALS
Status: DISCONTINUED | OUTPATIENT
Start: 2024-07-07 | End: 2024-07-10 | Stop reason: HOSPADM

## 2024-07-07 RX ORDER — LATANOPROST 50 UG/ML
1 SOLUTION/ DROPS OPHTHALMIC DAILY
Status: DISCONTINUED | OUTPATIENT
Start: 2024-07-07 | End: 2024-07-10 | Stop reason: HOSPADM

## 2024-07-07 RX ORDER — LEVOTHYROXINE SODIUM 0.03 MG/1
50 TABLET ORAL
Status: DISCONTINUED | OUTPATIENT
Start: 2024-07-08 | End: 2024-07-10 | Stop reason: HOSPADM

## 2024-07-07 RX ORDER — 0.9 % SODIUM CHLORIDE 0.9 %
1000 INTRAVENOUS SOLUTION INTRAVENOUS ONCE
Status: COMPLETED | OUTPATIENT
Start: 2024-07-07 | End: 2024-07-07

## 2024-07-07 RX ORDER — SENNOSIDES 8.6 MG
650 CAPSULE ORAL PRN
Status: DISCONTINUED | OUTPATIENT
Start: 2024-07-07 | End: 2024-07-10 | Stop reason: HOSPADM

## 2024-07-07 RX ORDER — ENOXAPARIN SODIUM 100 MG/ML
40 INJECTION SUBCUTANEOUS DAILY
Status: DISCONTINUED | OUTPATIENT
Start: 2024-07-08 | End: 2024-07-10 | Stop reason: HOSPADM

## 2024-07-07 RX ORDER — ASPIRIN 81 MG/1
81 TABLET ORAL DAILY
Status: DISCONTINUED | OUTPATIENT
Start: 2024-07-08 | End: 2024-07-10 | Stop reason: HOSPADM

## 2024-07-07 RX ORDER — DIPHENHYDRAMINE HCL 25 MG
25 CAPSULE ORAL NIGHTLY PRN
Status: DISCONTINUED | OUTPATIENT
Start: 2024-07-07 | End: 2024-07-10 | Stop reason: HOSPADM

## 2024-07-07 RX ORDER — VITAMIN B COMPLEX
1000 TABLET ORAL 2 TIMES DAILY
Status: DISCONTINUED | OUTPATIENT
Start: 2024-07-07 | End: 2024-07-10 | Stop reason: HOSPADM

## 2024-07-07 RX ORDER — SPIRONOLACTONE 25 MG/1
25 TABLET ORAL DAILY
Status: DISCONTINUED | OUTPATIENT
Start: 2024-07-08 | End: 2024-07-10 | Stop reason: HOSPADM

## 2024-07-07 RX ORDER — INSULIN LISPRO 100 [IU]/ML
0-4 INJECTION, SOLUTION INTRAVENOUS; SUBCUTANEOUS NIGHTLY
Status: DISCONTINUED | OUTPATIENT
Start: 2024-07-07 | End: 2024-07-10 | Stop reason: HOSPADM

## 2024-07-07 RX ORDER — POTASSIUM CHLORIDE 7.45 MG/ML
10 INJECTION INTRAVENOUS PRN
Status: DISCONTINUED | OUTPATIENT
Start: 2024-07-07 | End: 2024-07-10 | Stop reason: HOSPADM

## 2024-07-07 RX ORDER — SODIUM CHLORIDE 0.9 % (FLUSH) 0.9 %
5-40 SYRINGE (ML) INJECTION PRN
Status: DISCONTINUED | OUTPATIENT
Start: 2024-07-07 | End: 2024-07-10 | Stop reason: HOSPADM

## 2024-07-07 RX ORDER — ROSUVASTATIN CALCIUM 5 MG/1
10 TABLET, COATED ORAL NIGHTLY
Status: DISCONTINUED | OUTPATIENT
Start: 2024-07-07 | End: 2024-07-10 | Stop reason: HOSPADM

## 2024-07-07 RX ORDER — ONDANSETRON 4 MG/1
4 TABLET, ORALLY DISINTEGRATING ORAL EVERY 8 HOURS PRN
Status: DISCONTINUED | OUTPATIENT
Start: 2024-07-07 | End: 2024-07-10 | Stop reason: HOSPADM

## 2024-07-07 RX ORDER — ANASTROZOLE 1 MG/1
1 TABLET ORAL DAILY
Status: ON HOLD | COMMUNITY
End: 2024-07-09 | Stop reason: HOSPADM

## 2024-07-07 RX ORDER — DEXTROSE MONOHYDRATE 100 MG/ML
INJECTION, SOLUTION INTRAVENOUS CONTINUOUS PRN
Status: DISCONTINUED | OUTPATIENT
Start: 2024-07-07 | End: 2024-07-10 | Stop reason: HOSPADM

## 2024-07-07 RX ORDER — DONEPEZIL HYDROCHLORIDE 10 MG/1
10 TABLET, ORALLY DISINTEGRATING ORAL NIGHTLY
Status: ON HOLD | COMMUNITY
End: 2024-07-09 | Stop reason: HOSPADM

## 2024-07-07 RX ORDER — INSULIN GLARGINE 300 U/ML
15 INJECTION, SOLUTION SUBCUTANEOUS NIGHTLY
Status: ON HOLD | COMMUNITY
End: 2024-07-09 | Stop reason: HOSPADM

## 2024-07-07 RX ORDER — INSULIN LISPRO 100 [IU]/ML
0-4 INJECTION, SOLUTION INTRAVENOUS; SUBCUTANEOUS
Status: DISCONTINUED | OUTPATIENT
Start: 2024-07-07 | End: 2024-07-10 | Stop reason: HOSPADM

## 2024-07-07 RX ORDER — ACETAMINOPHEN 650 MG/1
650 SUPPOSITORY RECTAL EVERY 6 HOURS PRN
Status: DISCONTINUED | OUTPATIENT
Start: 2024-07-07 | End: 2024-07-10 | Stop reason: HOSPADM

## 2024-07-07 RX ORDER — POTASSIUM CHLORIDE 20 MEQ/1
40 TABLET, EXTENDED RELEASE ORAL PRN
Status: DISCONTINUED | OUTPATIENT
Start: 2024-07-07 | End: 2024-07-10 | Stop reason: HOSPADM

## 2024-07-07 RX ORDER — LETROZOLE 2.5 MG/1
2.5 TABLET, FILM COATED ORAL DAILY
Status: DISCONTINUED | OUTPATIENT
Start: 2024-07-07 | End: 2024-07-10 | Stop reason: HOSPADM

## 2024-07-07 RX ORDER — SODIUM CHLORIDE 9 MG/ML
INJECTION, SOLUTION INTRAVENOUS CONTINUOUS
Status: DISCONTINUED | OUTPATIENT
Start: 2024-07-07 | End: 2024-07-09

## 2024-07-07 RX ORDER — ONDANSETRON 2 MG/ML
4 INJECTION INTRAMUSCULAR; INTRAVENOUS EVERY 6 HOURS PRN
Status: DISCONTINUED | OUTPATIENT
Start: 2024-07-07 | End: 2024-07-10 | Stop reason: HOSPADM

## 2024-07-07 RX ORDER — SODIUM CHLORIDE 0.9 % (FLUSH) 0.9 %
5-40 SYRINGE (ML) INJECTION EVERY 12 HOURS SCHEDULED
Status: DISCONTINUED | OUTPATIENT
Start: 2024-07-07 | End: 2024-07-10 | Stop reason: HOSPADM

## 2024-07-07 RX ORDER — AMLODIPINE BESYLATE 5 MG/1
5 TABLET ORAL DAILY
Status: DISCONTINUED | OUTPATIENT
Start: 2024-07-08 | End: 2024-07-10 | Stop reason: HOSPADM

## 2024-07-07 RX ORDER — MAGNESIUM SULFATE IN WATER 40 MG/ML
2000 INJECTION, SOLUTION INTRAVENOUS PRN
Status: DISCONTINUED | OUTPATIENT
Start: 2024-07-07 | End: 2024-07-10 | Stop reason: HOSPADM

## 2024-07-07 RX ORDER — ACETAMINOPHEN 325 MG/1
650 TABLET ORAL EVERY 6 HOURS PRN
Status: DISCONTINUED | OUTPATIENT
Start: 2024-07-07 | End: 2024-07-10 | Stop reason: HOSPADM

## 2024-07-07 RX ORDER — SODIUM CHLORIDE 9 MG/ML
INJECTION, SOLUTION INTRAVENOUS PRN
Status: DISCONTINUED | OUTPATIENT
Start: 2024-07-07 | End: 2024-07-10 | Stop reason: HOSPADM

## 2024-07-07 RX ADMIN — LATANOPROST 1 DROP: 50 SOLUTION OPHTHALMIC at 21:13

## 2024-07-07 RX ADMIN — INSULIN GLARGINE 10 UNITS: 100 INJECTION, SOLUTION SUBCUTANEOUS at 21:13

## 2024-07-07 RX ADMIN — DEXTROSE MONOHYDRATE 125 ML: 100 INJECTION, SOLUTION INTRAVENOUS at 18:06

## 2024-07-07 RX ADMIN — DIPHENHYDRAMINE HYDROCHLORIDE 25 MG: 25 CAPSULE ORAL at 23:12

## 2024-07-07 RX ADMIN — CARVEDILOL 25 MG: 12.5 TABLET, FILM COATED ORAL at 21:14

## 2024-07-07 RX ADMIN — SODIUM CHLORIDE 1000 ML: 9 INJECTION, SOLUTION INTRAVENOUS at 13:23

## 2024-07-07 RX ADMIN — Medication 1000 UNITS: at 21:14

## 2024-07-07 RX ADMIN — SODIUM CHLORIDE: 9 INJECTION, SOLUTION INTRAVENOUS at 21:14

## 2024-07-07 RX ADMIN — ROSUVASTATIN CALCIUM 10 MG: 5 TABLET, COATED ORAL at 21:14

## 2024-07-07 ASSESSMENT — LIFESTYLE VARIABLES
HOW OFTEN DO YOU HAVE A DRINK CONTAINING ALCOHOL: NEVER
HOW MANY STANDARD DRINKS CONTAINING ALCOHOL DO YOU HAVE ON A TYPICAL DAY: PATIENT DOES NOT DRINK

## 2024-07-07 ASSESSMENT — PAIN - FUNCTIONAL ASSESSMENT: PAIN_FUNCTIONAL_ASSESSMENT: NONE - DENIES PAIN

## 2024-07-07 ASSESSMENT — PAIN SCALES - GENERAL
PAINLEVEL_OUTOF10: 0

## 2024-07-07 NOTE — PROGRESS NOTES
4 Eyes Skin Assessment     NAME:  Nata Catalan  YOB: 1945  MEDICAL RECORD NUMBER:  [id number]    The patient is being assessed for  Admission    I agree that at least one RN has performed a thorough Head to Toe Skin Assessment on the patient. ALL assessment sites listed below have been assessed.      Areas assessed by both nurses:    Head, Face, Ears, Shoulders, Back, Chest, Arms, Elbows, Hands, Sacrum. Buttock, Coccyx, Ischium, and Legs. Feet and Heels        Does the Patient have a Wound? No noted wound(s)       Toby Prevention initiated by RN: Yes  Wound Care Orders initiated by RN: No    Pressure Injury (Stage 3,4, Unstageable, DTI, NWPT, and Complex wounds) if present, place Wound referral order by RN under : No    New Ostomies, if present place, Ostomy referral order under : No     Nurse 1 eSignature: Electronically signed by Gaetano Flaherty RN on 7/7/24 at 6:24 PM EDT    **SHARE this note so that the co-signing nurse can place an eSignature**    Nurse 2 eSignature: Electronically signed by Vandana Waggoner RN on 7/7/24 at 7:50 PM EDT

## 2024-07-07 NOTE — ED PROVIDER NOTES
[facility] EMERGENCY DEPT  EMERGENCY DEPARTMENT HISTORY AND PHYSICAL EXAM      Date: 7/7/2024  Patient Name: Nata Catalan  MRN: [id number]  Birthdate 1945  Date of evaluation: 7/7/2024  Provider: Colton Shelby MD   Note Started: 1:02 PM EDT 7/7/24    HISTORY OF PRESENT ILLNESS     Chief Complaint   Patient presents with    Loss of Consciousness     thursday    Extremity Weakness       History Provided By: Patient    HPI: Nata Catalan is a 79 y.o. female PMH breast cancer currently on treatment but in remission, diabetes, heart failure, hypertension, hypothyroidism, no history of seizure or syncope as an adult presenting after a syncopal episode on Thursday, 3 days ago where she was on the ground and unconscious, unclear if she hit her head.  She does take aspirin but no full blood thinners.  She does not remember the preceding incident to what caused her to lose consciousness.  She reports she has had some weakness since then in the bilateral lower extremities.  She has been able to ambulate but is more difficult.  She denies any cardiac history such as cardiac dysrhythmia but she has seen a cardiologist before, she is not sure what they treat her for.  She denies any current pain.  She does report lightheadedness over the last several days but denies any current lightheadedness or dizziness.  Denies any chest pain or shortness of breath or fevers currently.    PAST MEDICAL HISTORY   Past Medical History:  Past Medical History:   Diagnosis Date    Breast cancer (HCC)     left    Diabetes (HCC)     Heart failure (HCC)     High cholesterol     Hypertension     Hypothyroid     Seizures (HCC)     as a child       Past Surgical History:  Past Surgical History:   Procedure Laterality Date    BACK SURGERY      implants placed between vertebrae     BREAST BIOPSY Right 2017    benign    BREAST LUMPECTOMY Left 04/2022    malignant    COLONOSCOPY      DILATION AND CURETTAGE OF UTERUS      HYSTERECTOMY (CERVIX STATUS

## 2024-07-07 NOTE — ED TRIAGE NOTES
Multiple episodes of weakness in last week. Endorses Syncopal Even on Thursday with EMS visit to house, denies ED visit.    Near syncope 0830 this AM with difficulty ambulating and generalized weakness.   NIH 0 with Triage.
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
## 2024-07-08 LAB
BASOPHILS # BLD: 0 K/UL (ref 0–0.1)
BASOPHILS NFR BLD: 1 % (ref 0–1)
DIFFERENTIAL METHOD BLD: ABNORMAL
EOSINOPHIL # BLD: 0.1 K/UL (ref 0–0.4)
EOSINOPHIL NFR BLD: 2 % (ref 0–7)
ERYTHROCYTE [DISTWIDTH] IN BLOOD BY AUTOMATED COUNT: 13.8 % (ref 11.5–14.5)
EST. AVERAGE GLUCOSE BLD GHB EST-MCNC: 140 MG/DL
GLUCOSE BLD STRIP.AUTO-MCNC: 100 MG/DL (ref 65–100)
GLUCOSE BLD STRIP.AUTO-MCNC: 102 MG/DL (ref 65–100)
GLUCOSE BLD STRIP.AUTO-MCNC: 115 MG/DL (ref 65–100)
GLUCOSE BLD STRIP.AUTO-MCNC: 137 MG/DL (ref 65–100)
GLUCOSE BLD STRIP.AUTO-MCNC: 89 MG/DL (ref 65–100)
HBA1C MFR BLD: 6.5 % (ref 4–5.6)
HCT VFR BLD AUTO: 31.8 % (ref 35–47)
HGB BLD-MCNC: 10.5 G/DL (ref 11.5–16)
IMM GRANULOCYTES # BLD AUTO: 0 K/UL (ref 0–0.04)
IMM GRANULOCYTES NFR BLD AUTO: 0 % (ref 0–0.5)
LYMPHOCYTES # BLD: 1.4 K/UL (ref 0.8–3.5)
LYMPHOCYTES NFR BLD: 27 % (ref 12–49)
MCH RBC QN AUTO: 28.4 PG (ref 26–34)
MCHC RBC AUTO-ENTMCNC: 33 G/DL (ref 30–36.5)
MCV RBC AUTO: 85.9 FL (ref 80–99)
MONOCYTES # BLD: 0.5 K/UL (ref 0–1)
MONOCYTES NFR BLD: 10 % (ref 5–13)
NEUTS SEG # BLD: 3.1 K/UL (ref 1.8–8)
NEUTS SEG NFR BLD: 60 % (ref 32–75)
NRBC # BLD: 0 K/UL (ref 0–0.01)
NRBC BLD-RTO: 0 PER 100 WBC
PERFORMED BY:: ABNORMAL
PERFORMED BY:: NORMAL
PERFORMED BY:: NORMAL
PLATELET # BLD AUTO: 181 K/UL (ref 150–400)
PMV BLD AUTO: 9.6 FL (ref 8.9–12.9)
RBC # BLD AUTO: 3.7 M/UL (ref 3.8–5.2)
WBC # BLD AUTO: 5.2 K/UL (ref 3.6–11)

## 2024-07-08 PROCEDURE — 85025 COMPLETE CBC W/AUTO DIFF WBC: CPT

## 2024-07-08 PROCEDURE — 82962 GLUCOSE BLOOD TEST: CPT

## 2024-07-08 PROCEDURE — 6370000000 HC RX 637 (ALT 250 FOR IP): Performed by: INTERNAL MEDICINE

## 2024-07-08 PROCEDURE — 2580000003 HC RX 258: Performed by: INTERNAL MEDICINE

## 2024-07-08 PROCEDURE — G0378 HOSPITAL OBSERVATION PER HR: HCPCS

## 2024-07-08 PROCEDURE — 36415 COLL VENOUS BLD VENIPUNCTURE: CPT

## 2024-07-08 PROCEDURE — 1100000000 HC RM PRIVATE

## 2024-07-08 PROCEDURE — 95816 EEG AWAKE AND DROWSY: CPT

## 2024-07-08 RX ADMIN — GLIPIZIDE 5 MG: 5 TABLET ORAL at 16:47

## 2024-07-08 RX ADMIN — INSULIN GLARGINE 10 UNITS: 100 INJECTION, SOLUTION SUBCUTANEOUS at 22:13

## 2024-07-08 RX ADMIN — ASPIRIN 81 MG: 81 TABLET, COATED ORAL at 08:10

## 2024-07-08 RX ADMIN — ROSUVASTATIN CALCIUM 10 MG: 5 TABLET, COATED ORAL at 22:13

## 2024-07-08 RX ADMIN — LEVOTHYROXINE SODIUM 50 MCG: 0.03 TABLET ORAL at 05:37

## 2024-07-08 RX ADMIN — Medication 1000 UNITS: at 08:11

## 2024-07-08 RX ADMIN — AMLODIPINE BESYLATE 5 MG: 5 TABLET ORAL at 08:11

## 2024-07-08 RX ADMIN — LATANOPROST 1 DROP: 50 SOLUTION OPHTHALMIC at 08:12

## 2024-07-08 RX ADMIN — SPIRONOLACTONE 25 MG: 25 TABLET ORAL at 08:11

## 2024-07-08 RX ADMIN — Medication 1000 UNITS: at 22:13

## 2024-07-08 RX ADMIN — SODIUM CHLORIDE, PRESERVATIVE FREE 10 ML: 5 INJECTION INTRAVENOUS at 08:16

## 2024-07-08 ASSESSMENT — PAIN SCALES - GENERAL
PAINLEVEL_OUTOF10: 0

## 2024-07-08 NOTE — CARE COORDINATION
DCP: home with spouse and HH    1440: CM met with pt and family at bedside to discuss dispo. Pt is requesting HH services to be arranged. CM to send referrals and will provide pt with list of accepting HH.

## 2024-07-08 NOTE — H&P
Department of Internal Medicine  General Internal Medicine  Attending History and Physical      CHIEF COMPLAINT: Syncope and weakness of lower extremity    Reason for Admission: Syncope  Lower extremity weakness  Possible CVA  History of fall at home    History Obtained From:  patient    HISTORY OF PRESENT ILLNESS:      The patient is a 79 y.o. female with significant past medical history of breast cancer, diabetes, congestive heart failure, hypertension, hypothyroidism and seizures who presents with weakness left lower extremity and fall at home she denies any chest pain no dizziness no shortness of breath reconciliation of her medications were done CT of the head is negative    Past Medical History:        Diagnosis Date    Breast cancer (HCC)     left    Diabetes (HCC)     Heart failure (HCC)     High cholesterol     Hypertension     Hypothyroid     Seizures (HCC)     as a child     Past Surgical History:        Procedure Laterality Date    BACK SURGERY      implants placed between vertebrae     BREAST BIOPSY Right 2017    benign    BREAST LUMPECTOMY Left 04/2022    malignant    COLONOSCOPY      DILATION AND CURETTAGE OF UTERUS      HYSTERECTOMY (CERVIX STATUS UNKNOWN)      IR INSERT/REPLACE SPINE NEUROSTIM GENERATOR Left 11/19/2018    Pt states stimulator    PORT SURGERY N/A 8/9/2023    PORT REMOVAL performed by Latha White MD at [facility] MAIN OR    US BREAST BIOPSY W LOC DEVICE 1ST LESION LEFT Left 03/23/2022    US BREAST NEEDLE BIOPSY LEFT 3/23/2022 [facility] RAD MAMMO     Immunizations:                Influenza:  Indicated for current flu vaccination season Oct. to Feb.            Pneumococcal Polysaccharide:  Indicated for current flu vaccination season Oct. to Feb.    Medications Prior to Admission:    Medications Prior to Admission: donepezil (ARICEPT ODT) 10 MG disintegrating tablet, Take 1 tablet by mouth nightly  Insulin Glargine, 2 Unit Dial, (TOUJEO MAX SOLOSTAR) 300 UNIT/ML SOPN, Inject 15 Units

## 2024-07-08 NOTE — PLAN OF CARE
Problem: Discharge Planning  Goal: Discharge to home or other facility with appropriate resources  7/7/2024 1822 by Gaetano Flaherty RN  Outcome: Progressing     Problem: Safety - Adult  Goal: Free from fall injury  7/8/2024 0051 by Óscar Gonzalez RN  Outcome: Progressing  7/7/2024 1822 by Gaetano Flaherty RN  Outcome: Progressing     Problem: ABCDS Injury Assessment  Goal: Absence of physical injury  7/7/2024 1822 by Gaetano Flaherty RN  Outcome: Progressing     Problem: Chronic Conditions and Co-morbidities  Goal: Patient's chronic conditions and co-morbidity symptoms are monitored and maintained or improved  Outcome: Progressing

## 2024-07-08 NOTE — PLAN OF CARE
Problem: Discharge Planning  Goal: Discharge to home or other facility with appropriate resources  Outcome: Progressing     Problem: Safety - Adult  Goal: Free from fall injury  7/8/2024 0914 by Gaetano Flaherty RN  Outcome: Progressing  7/8/2024 0051 by Óscar Gonzalez RN  Outcome: Progressing     Problem: ABCDS Injury Assessment  Goal: Absence of physical injury  Outcome: Progressing     Problem: Pain  Goal: Verbalizes/displays adequate comfort level or baseline comfort level  Outcome: Progressing     Problem: Chronic Conditions and Co-morbidities  Goal: Patient's chronic conditions and co-morbidity symptoms are monitored and maintained or improved  7/8/2024 0914 by Gaetano Flaherty RN  Outcome: Progressing  7/8/2024 0051 by Óscar Gonzalez RN  Outcome: Progressing

## 2024-07-08 NOTE — CONSULTS
Consultation    NAME: Nata Catalan   :  1945   MRN:  [id number]     Date/Time:  2024 7:53 AM    Patient PCP: Ronald Man MD  ________________________________________________________________________  Primary cardiologist: [facility]  (Axel Ronquillo MD)    Assessment:   1.  Syncope  2.  Grade I (mild) diastolic dysfunction, or impaired relaxation  3.  Chronic heart failure with preserved ejection fraction (HFpEF)  4.  Hypertension  5.  Dyslipidemia  6.  Type 2 diabetes  7.  Chronic kidney disease  8.  Hypothyroidism  9.  History of breast cancer (in remission  10.  History of childhood seizures    11.  Anemia  12.  Hyperchloremia     []        High complexity decision making was performed      Subjective:   CHIEF COMPLAINT:     HISTORY OF PRESENT ILLNESS:     Nata Catalan, is a very pleasant 79-year-old -American female with no known coronary atherosclerosis.  There is no history of previous myocardial infarction.  The patient does give a diagnosis of \"congestive heart failure\".  She does not recall previous catheterization or any interventions.    The patient was in her usual state of health until 3 days prior to admission.  She describes \"passing out\".  At that time she did not seek medical attention.  The morning of admission she again felt lightheaded or dizzy.  There was no faustina loss of consciousness.  Because of her recurrence of symptoms she presented to the emergency department.    In the emergency department she was treated according to the \"chest pain protocol\".  Her twelve-lead EKG and cardiac enzymes were unremarkable.  In addition, she also had a \"stat\" head CT which was also unremarkable.  Subsequently she is admitted to general medicine with telemetry.  Cardiology is consulted to assist in the evaluation and management.    The patient continues to deny any cardiovascular complaints.  Specifically, she denies any chest pain, pressure or

## 2024-07-08 NOTE — CARE COORDINATION
07/08/24 1111   Service Assessment   Patient Orientation Unable to Assess  (Pt off unit,  at bedside)   Cognition Other (see comment)  (Pt off unit,  at bedside)   History Provided By Spouse   Primary Caregiver Self   Accompanied By/Relationship spouse at bedside   Support Systems Spouse/Significant Other   Patient's Healthcare Decision Maker is: Legal Next of Kin   PCP Verified by CM Yes  (Dr. Ronald Man, last seen 2-3 weeks ago)   Last Visit to PCP Within last 3 months   Prior Functional Level Independent in ADLs/IADLs   Current Functional Level Independent in ADLs/IADLs   Can patient return to prior living arrangement Yes   Ability to make needs known: Good   Family able to assist with home care needs: Yes   Would you like for me to discuss the discharge plan with any other family members/significant others, and if so, who? Yes  (spouse)   Financial Resources Medicare   Community Resources None   CM/SW Referral Other (see comment)  (discharge planning)   Social/Functional History   Lives With Spouse   Type of Home House   Home Equipment None   Discharge Planning   Patient expects to be discharged to: [facility]     CM met with pt's  in pt room to discuss dispo and verified demographics, pt off unit at this time. Pt is from home with spouse and independent with ADLs and iADLs at baseline. Pt has 2 JUAQUIN with no rails.  denies DME use, no hx of IPR/SNF.  does state that there was a nurse that came out to visit about 1 year ago, not sure if it was HH.  will transport when cleared for dc.     Rx: [facility] [address]  Meds to Bed declined at this time      Advance Care Planning     General Advance Care Planning (ACP) Conversation    Date of Conversation: 7/8/2024  Conducted with: Legal next of kin  Other persons present: None    Healthcare Decision Maker:   Primary Decision Maker: Ronald Catalan - Spouse - [phone]  Click here to complete Healthcare Decision Makers including  selection of the Healthcare Decision Maker Relationship (ie \"Primary\").       Content/Action Overview:  Length of Voluntary ACP Conversation in minutes:  <16 minutes (Non-Billable)    Nabil Griffin RN

## 2024-07-09 LAB
GLUCOSE BLD STRIP.AUTO-MCNC: 106 MG/DL (ref 65–100)
GLUCOSE BLD STRIP.AUTO-MCNC: 82 MG/DL (ref 65–100)
GLUCOSE BLD STRIP.AUTO-MCNC: 84 MG/DL (ref 65–100)
GLUCOSE BLD STRIP.AUTO-MCNC: 90 MG/DL (ref 65–100)
PERFORMED BY:: ABNORMAL
PERFORMED BY:: NORMAL

## 2024-07-09 PROCEDURE — 2580000003 HC RX 258: Performed by: INTERNAL MEDICINE

## 2024-07-09 PROCEDURE — 96372 THER/PROPH/DIAG INJ SC/IM: CPT

## 2024-07-09 PROCEDURE — G0378 HOSPITAL OBSERVATION PER HR: HCPCS

## 2024-07-09 PROCEDURE — 82962 GLUCOSE BLOOD TEST: CPT

## 2024-07-09 PROCEDURE — 6370000000 HC RX 637 (ALT 250 FOR IP): Performed by: INTERNAL MEDICINE

## 2024-07-09 PROCEDURE — 1100000000 HC RM PRIVATE

## 2024-07-09 PROCEDURE — 6360000002 HC RX W HCPCS: Performed by: INTERNAL MEDICINE

## 2024-07-09 RX ORDER — CARVEDILOL 12.5 MG/1
25 TABLET ORAL 2 TIMES DAILY WITH MEALS
OUTPATIENT
Start: 2024-07-10

## 2024-07-09 RX ORDER — GLIPIZIDE 5 MG/1
5 TABLET ORAL
OUTPATIENT
Start: 2024-07-10

## 2024-07-09 RX ORDER — LEVOTHYROXINE SODIUM 0.03 MG/1
50 TABLET ORAL
OUTPATIENT
Start: 2024-07-10

## 2024-07-09 RX ORDER — ROSUVASTATIN CALCIUM 5 MG/1
10 TABLET, COATED ORAL NIGHTLY
OUTPATIENT
Start: 2024-07-09

## 2024-07-09 RX ORDER — DIPHENHYDRAMINE HCL 25 MG
25 CAPSULE ORAL NIGHTLY PRN
OUTPATIENT
Start: 2024-07-09

## 2024-07-09 RX ORDER — GLIPIZIDE 5 MG/1
5 TABLET ORAL
Qty: 60 TABLET | Refills: 3 | Status: SHIPPED | OUTPATIENT
Start: 2024-07-10

## 2024-07-09 RX ORDER — INSULIN GLARGINE 100 [IU]/ML
0.15 INJECTION, SOLUTION SUBCUTANEOUS NIGHTLY
Status: CANCELLED | OUTPATIENT
Start: 2024-07-09

## 2024-07-09 RX ORDER — INSULIN GLARGINE 100 [IU]/ML
0.15 INJECTION, SOLUTION SUBCUTANEOUS NIGHTLY
Qty: 10 ML | Refills: 3 | Status: SHIPPED | OUTPATIENT
Start: 2024-07-09

## 2024-07-09 RX ORDER — LETROZOLE 2.5 MG/1
2.5 TABLET, FILM COATED ORAL DAILY
OUTPATIENT
Start: 2024-07-10

## 2024-07-09 RX ORDER — VITAMIN B COMPLEX
1000 TABLET ORAL 2 TIMES DAILY
OUTPATIENT
Start: 2024-07-09

## 2024-07-09 RX ORDER — LATANOPROST 50 UG/ML
1 SOLUTION/ DROPS OPHTHALMIC DAILY
OUTPATIENT
Start: 2024-07-10

## 2024-07-09 RX ORDER — AMLODIPINE BESYLATE 5 MG/1
5 TABLET ORAL DAILY
OUTPATIENT
Start: 2024-07-10

## 2024-07-09 RX ORDER — DIPHENHYDRAMINE HCL 25 MG
25 CAPSULE ORAL NIGHTLY PRN
Qty: 10 CAPSULE | Refills: 0 | Status: SHIPPED | OUTPATIENT
Start: 2024-07-09 | End: 2024-07-19

## 2024-07-09 RX ORDER — ASPIRIN 81 MG/1
81 TABLET ORAL DAILY
OUTPATIENT
Start: 2024-07-10

## 2024-07-09 RX ADMIN — SODIUM CHLORIDE: 9 INJECTION, SOLUTION INTRAVENOUS at 12:15

## 2024-07-09 RX ADMIN — SPIRONOLACTONE 25 MG: 25 TABLET ORAL at 09:35

## 2024-07-09 RX ADMIN — GLIPIZIDE 5 MG: 5 TABLET ORAL at 15:21

## 2024-07-09 RX ADMIN — SODIUM CHLORIDE, PRESERVATIVE FREE 10 ML: 5 INJECTION INTRAVENOUS at 09:50

## 2024-07-09 RX ADMIN — CARVEDILOL 25 MG: 12.5 TABLET, FILM COATED ORAL at 15:22

## 2024-07-09 RX ADMIN — ENOXAPARIN SODIUM 40 MG: 100 INJECTION SUBCUTANEOUS at 09:30

## 2024-07-09 RX ADMIN — ROSUVASTATIN CALCIUM 10 MG: 5 TABLET, COATED ORAL at 20:46

## 2024-07-09 RX ADMIN — CARVEDILOL 25 MG: 12.5 TABLET, FILM COATED ORAL at 08:04

## 2024-07-09 RX ADMIN — AMLODIPINE BESYLATE 5 MG: 5 TABLET ORAL at 09:35

## 2024-07-09 RX ADMIN — SODIUM CHLORIDE, PRESERVATIVE FREE 10 ML: 5 INJECTION INTRAVENOUS at 20:46

## 2024-07-09 RX ADMIN — LEVOTHYROXINE SODIUM 50 MCG: 0.03 TABLET ORAL at 06:05

## 2024-07-09 RX ADMIN — GLIPIZIDE 5 MG: 5 TABLET ORAL at 06:05

## 2024-07-09 RX ADMIN — Medication 1000 UNITS: at 09:35

## 2024-07-09 RX ADMIN — Medication 1000 UNITS: at 20:46

## 2024-07-09 RX ADMIN — LATANOPROST 1 DROP: 50 SOLUTION OPHTHALMIC at 09:47

## 2024-07-09 RX ADMIN — ASPIRIN 81 MG: 81 TABLET, COATED ORAL at 09:35

## 2024-07-09 ASSESSMENT — PAIN SCALES - GENERAL: PAINLEVEL_OUTOF10: 0

## 2024-07-09 NOTE — CARE COORDINATION
DCP: home with Home Health, CM to meet with pt at bedside for final HH choice (3 accepting agencies: [facility], [facility], and [facility])    1430: CM met with pt and  at bedside to discuss dispo and get final choice for HH. Pt chose [facility] as final choice.

## 2024-07-09 NOTE — PLAN OF CARE
Problem: Discharge Planning  Goal: Discharge to home or other facility with appropriate resources  Outcome: Progressing  Flowsheets (Taken 7/9/2024 0800)  Discharge to home or other facility with appropriate resources:   Identify barriers to discharge with patient and caregiver   Arrange for needed discharge resources and transportation as appropriate   Identify discharge learning needs (meds, wound care, etc)     Problem: Safety - Adult  Goal: Free from fall injury  Outcome: Progressing     Problem: ABCDS Injury Assessment  Goal: Absence of physical injury  Outcome: Progressing     Problem: Pain  Goal: Verbalizes/displays adequate comfort level or baseline comfort level  Outcome: Progressing     Problem: Chronic Conditions and Co-morbidities  Goal: Patient's chronic conditions and co-morbidity symptoms are monitored and maintained or improved  Outcome: Progressing  Flowsheets (Taken 7/9/2024 0800)  Care Plan - Patient's Chronic Conditions and Co-Morbidity Symptoms are Monitored and Maintained or Improved: Monitor and assess patient's chronic conditions and comorbid symptoms for stability, deterioration, or improvement

## 2024-07-09 NOTE — PLAN OF CARE
Problem: Discharge Planning  Goal: Discharge to home or other facility with appropriate resources  Outcome: Progressing  Flowsheets (Taken 7/8/2024 2035)  Discharge to home or other facility with appropriate resources:   Identify barriers to discharge with patient and caregiver   Arrange for needed discharge resources and transportation as appropriate   Identify discharge learning needs (meds, wound care, etc)     Problem: Safety - Adult  Goal: Free from fall injury  Outcome: Progressing     Problem: ABCDS Injury Assessment  Goal: Absence of physical injury  Outcome: Progressing     Problem: Pain  Goal: Verbalizes/displays adequate comfort level or baseline comfort level  Outcome: Progressing     Problem: Chronic Conditions and Co-morbidities  Goal: Patient's chronic conditions and co-morbidity symptoms are monitored and maintained or improved  Outcome: Progressing  Flowsheets (Taken 7/8/2024 2035)  Care Plan - Patient's Chronic Conditions and Co-Morbidity Symptoms are Monitored and Maintained or Improved:   Monitor and assess patient's chronic conditions and comorbid symptoms for stability, deterioration, or improvement   Collaborate with multidisciplinary team to address chronic and comorbid conditions and prevent exacerbation or deterioration   Update acute care plan with appropriate goals if chronic or comorbid symptoms are exacerbated and prevent overall improvement and discharge

## 2024-07-09 NOTE — PROGRESS NOTES
Progress Note      7/9/2024 7:45 AM  NAME: Nata Catalan   MRN:  [id number]   Admit Diagnosis: Syncope and collapse [R55]      Primary cardiologist: [facility]  (Axel Ronquillo MD)     Assessment:    1.  Syncope  2.  Grade I (mild) diastolic dysfunction, or impaired relaxation  3.  Chronic heart failure with preserved ejection fraction (HFpEF)  4.  Hypertension  5.  Dyslipidemia  6.  Type 2 diabetes  7.  Chronic kidney disease  8.  Hypothyroidism  9.  History of breast cancer (in remission)  10.  History of childhood seizures     11.  Anemia  12.  Hyperchloremia      Subjective:     Nata Catalan, is seen and examined in room 209.  There were no acute cardiovascular events reported overnight.  Currently, she denies any cardiovascular complaints.  Specifically, she denies any chest pain, pressure or tightness.  Further, there is no shortness of breath or dyspnea exertion.    The patient denies awareness of rapid heart rate, palpitations or missed beats.    The patient does have an appointment next week with her primary cardiologist.  From a cardiovascular standpoint the patient may be discharged home and follow-up with her primary cardiologist as planned.  She may benefit from outpatient extended Holter monitor.    Discussed with RN events overnight.     Medications Personally Reviewed:    Current Facility-Administered Medications   Medication Dose Route Frequency    acetaminophen (TYLENOL) extended release tablet 650 mg  650 mg Oral PRN    glucose chewable tablet 16 g  4 tablet Oral PRN    dextrose bolus 10% 125 mL  125 mL IntraVENous PRN    Or    dextrose bolus 10% 250 mL  250 mL IntraVENous PRN    dextrose 10 % infusion   IntraVENous Continuous PRN    insulin glargine (LANTUS) injection vial 10 Units  0.15 Units/kg SubCUTAneous Nightly    insulin lispro (HUMALOG,ADMELOG) injection vial 0-4 Units  0-4 Units SubCUTAneous TID WC    insulin lispro (HUMALOG,ADMELOG) injection   Wt 63.5 kg (140 lb)   SpO2 99%   BMI 25.20 kg/m²     Intake/Output Summary (Last 24 hours) at 7/9/2024 0745  Last data filed at 7/9/2024 0621  Gross per 24 hour   Intake 480 ml   Output 120 ml   Net 360 ml        General Appearance: Well developed, no acute respiratory distress.  Chest: Lungs clear to auscultation bilaterally.  Cardiovascular: JVP is not elevated, PMI is not attempted.  Normal intensity S1 and S2, without S3.  There are no ectopic beats..  Abdomen: Soft, non-tender, bowel sounds are active.  Extremities: No edema bilaterally.    Data Review    Telemetry: Sinus rhythm without significant ventricular ectopy.    EKG:   []  No new EKG for review    Lab Data Personally Reviewed:    Recent Labs     07/07/24  1132 07/08/24  0508   WBC 4.9 5.2   HGB 11.2* 10.5*   HCT 34.3* 31.8*    181     No results for input(s): \"INR\", \"APTT\" in the last 72 hours.    Invalid input(s): \"PTP\"   Recent Labs     07/07/24  1132      K 4.0   *   CO2 25   BUN 9   MG 1.6     No results for input(s): \"CPK\" in the last 72 hours.    Invalid input(s): \"CPKMB\", \"CKNDX\", \"TROIQ\"  Lab Results   Component Value Date/Time    CHOL 84 09/21/2022 07:31 AM    HDL 31 09/21/2022 07:31 AM    LDL 27.8 09/21/2022 07:31 AM       Recent Labs     07/07/24  1132   GLOB 3.1     No results for input(s): \"PH\", \"PCO2\", \"PO2\" in the last 72 hours.        Assessment/Plan:   1.  Continue telemetry monitoring while hospitalized  2.  Continue to monitor serum electrolytes, and renal function  3.  Continue to monitor fluid balance, daily weights  4.  No further cardiac testing indicated at this time  5.  Provide hydralazine as needed SBP >160 mmHg    6.  Continue other cardiovascular medications including amlodipine, aspirin, carvedilol, enoxaparin, glipizide, insulin, and rosuvastatin  7.  The patient is to follow-up with her primary cardiologist within 1 to 2 weeks after discharge  Yuan Celaya MD    (Please note that parts of

## 2024-07-09 NOTE — DISCHARGE SUMMARY
Discharge Summary    Nata Catalan  :  1945  MRN:  [id number]    ADMIT DATE:  2024  DISCHARGE DATE:  2024    PRIMARY CARE PHYSICIAN:  Ronald Man MD    VISIT STATUS: Observation    CODE STATUS:  Full Code    DISCHARGE DIAGNOSES:  Principal Problem:    Syncope and collapse  Resolved Problems:    * No resolved hospital problems. *      HOSPITAL COURSE:  79 years old patient history of breast cancer diabetes CHF hypertension hypothyroidism seizure while in childhood old presented with complaint of fall at home with syncope.  CT of the head was negative patient has a neurostimulator unable to obtain an MRI patient was seen by cardiology  Diastolic mild heart disease  Diabetes  Possible vasovagal syncope  SIGNIFICANT DIAGNOSTIC STUDIES:  Echocardiogram  This is a summary report. The complete report is available in the patient's medical record. If you cannot access the medical record, please contact the sending organization for a detailed fax or copy.       Left Ventricle: Normal left ventricular systolic function with a visually estimated EF of 50 - 55%. Left ventricle size is normal. Mild septal thickening. Findings consistent with mild concentric hypertrophy. Normal wall motion. Diastolic dysfunction present with increased LAP with normal LV EF. Mildly increased end diastolic volume. Grade I (mild) diastolic dysfunction, or impaired relaxation.    Mitral Valve: Mildly thickened leaflet.  CONSULTANTS:  Cardiology  RECOMMENDED NEXT STEPS:        DISCHARGE MEDICATIONS:         Medication List        ASK your doctor about these medications      acetaminophen 650 MG extended release tablet  Commonly known as: TYLENOL     amLODIPine 5 MG tablet  Commonly known as: NORVASC     anastrozole 1 MG tablet  Commonly known as: ARIMIDEX     carvedilol 25 MG tablet  Commonly known as: COREG     Cholecalciferol 50 MCG (2000) Tabs     donepezil 10 MG disintegrating tablet  Commonly known as: ARICEPT ODT      glimepiride 4 MG tablet  Commonly known as: AMARYL     latanoprost 0.005 % ophthalmic solution  Commonly known as: XALATAN     letrozole 2.5 MG tablet  Commonly known as: FEMARA     levothyroxine 50 MCG tablet  Commonly known as: SYNTHROID     lisinopril 20 MG tablet  Commonly known as: PRINIVIL;ZESTRIL     rosuvastatin 10 MG tablet  Commonly known as: CRESTOR     spironolactone 25 MG tablet  Commonly known as: ALDACTONE     Toujeo Max SoloStar 300 UNIT/ML Sopn  Generic drug: Insulin Glargine (2 Unit Dial)     [place] 81 MG Caps  Generic drug: Aspirin              DIET: ADULT DIET; Regular; 3 carb choices (45 gm/meal)    ACTIVITY: up with assist  ______________________________________________________________________  COMPLEXITY OF FOLLOW UP:   [] Moderate Complexity: follow up within 7-14 calendar days (62086)   [] Severe Complexity: follow up within 7 calendar days (42233)    FOLLOW UP TESTING, PENDING RESULTS OR REFERRALS AT TRANSITIONAL CARE VISIT:   []  Yes    []  No    PENDING STUDIES:   Outpatient MRI of the brain and the patient with neurostimulator    DISPOSITION: Home with Home Health Care    FACILITY/HOME CARE AGENCY NAME:     Follow up with No follow-up provider specified. on     INSTRUCTIONS TO MA/SW: Please call patient on day after discharge (must document patient  contacted within 2 business days of discharge).    FOLLOW UP QUESTIONS FOR MA/SW:  1. Did you get medications filled and taking them as instructed from discharge?  2. Are you following your discharge instructions from your hospital stay?  3. Please confirm patient is scheduled for a follow up appointment within the above time frame.    DISCHARGE TIME: > 30 minutes    SIGNED:  Tre Murguia MD   7/9/2024, 5:32 PM

## 2024-07-09 NOTE — PROGRESS NOTES
Comprehensive Nutrition Assessment    Type and Reason for Visit:  Initial (MST 2)    Nutrition Recommendations/Plan:   Continue diet.  Continue to monitor and record PO intakes, BM in I/Os.     Malnutrition Assessment:  Malnutrition Status:  Moderate malnutrition (07/09/24 1139)    Context:  Chronic Illness     Findings of the 6 clinical characteristics of malnutrition:  Energy Intake:  Mild decrease in energy intake (Comment) (d/t recent dental sx.)  Weight Loss:  No significant weight loss     Body Fat Loss:  Mild body fat loss Triceps   Muscle Mass Loss:  Mild muscle mass loss Clavicles (pectoralis & deltoids), Thigh (quadriceps), Calf (gastrocnemius)  Fluid Accumulation:  No significant fluid accumulation     Strength:  Not Performed    Nutrition Assessment:    Admitted for syncope and collapse. Screened for MST 2 d/t noted weight loss and poor intakes PTA. QMB=712# 1-1.5 yrs ago prior to Breast cancer dx and chemo tx. Pt endorsed good appetite/intakes at baseline, recent decline in intakes 2/2 dental sx. PO intakes now improved w/ >75% of meals. Continue diet. Labs: A1c 6.5%, H/H 10.5/31.8. Meds: Vit D, SSI, lantus, synthroid, aldactone, glipizide, coreg, aspirin, lovenox.    Nutrition Related Findings:    NFPE w/ moderate chronic wasting per Pt. No N/V/D/C nor chewing/swallowing difficulties per Pt. Pt endorsed recent dental sx. Last BM 7/8 per Pt. No edema. Wound Type: None       Current Nutrition Intake & Therapies:    Average Meal Intake: %  Average Supplements Intake: None Ordered  ADULT DIET; Regular; 3 carb choices (45 gm/meal)    Anthropometric Measures:  Height: 158.8 cm (5' 2.52\")  Ideal Body Weight (IBW): 113 lbs (51 kg)       Current Body Weight: 60.6 kg (133 lb 8 oz) (7/9, RD obtained.), 118.1 % IBW. Weight Source: Bed Scale  Current BMI (kg/m2): 24  Usual Body Weight: 60.9 kg (134 lb 3.2 oz) (1 yr ago per EMR.)  % Weight Change (Calculated): -0.5  Weight Adjustment For: No Adjustment

## 2024-07-10 VITALS
RESPIRATION RATE: 17 BRPM | SYSTOLIC BLOOD PRESSURE: 90 MMHG | DIASTOLIC BLOOD PRESSURE: 69 MMHG | HEART RATE: 58 BPM | OXYGEN SATURATION: 95 % | HEIGHT: 63 IN | TEMPERATURE: 98.2 F | WEIGHT: 140 LBS | BODY MASS INDEX: 24.8 KG/M2

## 2024-07-10 LAB
GLUCOSE BLD STRIP.AUTO-MCNC: 82 MG/DL (ref 65–100)
GLUCOSE BLD STRIP.AUTO-MCNC: 84 MG/DL (ref 65–100)
PERFORMED BY:: NORMAL
PERFORMED BY:: NORMAL

## 2024-07-10 PROCEDURE — G0378 HOSPITAL OBSERVATION PER HR: HCPCS

## 2024-07-10 PROCEDURE — 6370000000 HC RX 637 (ALT 250 FOR IP): Performed by: INTERNAL MEDICINE

## 2024-07-10 PROCEDURE — 82962 GLUCOSE BLOOD TEST: CPT

## 2024-07-10 RX ORDER — HYDRALAZINE HYDROCHLORIDE 25 MG/1
25 TABLET, FILM COATED ORAL EVERY 8 HOURS SCHEDULED
Status: DISCONTINUED | OUTPATIENT
Start: 2024-07-10 | End: 2024-07-10 | Stop reason: HOSPADM

## 2024-07-10 RX ADMIN — ASPIRIN 81 MG: 81 TABLET, COATED ORAL at 08:58

## 2024-07-10 RX ADMIN — LEVOTHYROXINE SODIUM 50 MCG: 0.03 TABLET ORAL at 06:06

## 2024-07-10 RX ADMIN — AMLODIPINE BESYLATE 5 MG: 5 TABLET ORAL at 08:59

## 2024-07-10 RX ADMIN — GLIPIZIDE 5 MG: 5 TABLET ORAL at 06:07

## 2024-07-10 RX ADMIN — LATANOPROST 1 DROP: 50 SOLUTION OPHTHALMIC at 08:59

## 2024-07-10 RX ADMIN — Medication 1000 UNITS: at 08:59

## 2024-07-10 RX ADMIN — HYDRALAZINE HYDROCHLORIDE 25 MG: 25 TABLET ORAL at 08:59

## 2024-07-10 RX ADMIN — SPIRONOLACTONE 25 MG: 25 TABLET ORAL at 08:59

## 2024-07-10 RX ADMIN — CARVEDILOL 25 MG: 12.5 TABLET, FILM COATED ORAL at 08:57

## 2024-07-10 ASSESSMENT — PAIN SCALES - GENERAL: PAINLEVEL_OUTOF10: 0

## 2024-07-10 NOTE — PROGRESS NOTES
Progress Note      7/10/2024 7:29 AM  NAME: Nata Catalan   MRN:  [id number]   Admit Diagnosis: Syncope and collapse [R55]      Primary cardiologist: [facility]  (Axel Ronquillo MD)     Assessment:    1.  Syncope  2.  Grade I (mild) diastolic dysfunction, or impaired relaxation  3.  Chronic heart failure with preserved ejection fraction (HFpEF)  4.  Hypertension  5.  Dyslipidemia  6.  Type 2 diabetes  7.  Chronic kidney disease  8.  Hypothyroidism  9.  History of breast cancer (in remission)  10.  History of childhood seizures     11.  Anemia  12.  Hyperchloremia      Subjective:     Nata Catalan, is seen and examined in room 209.  There were no acute cardiovascular events reported overnight.  Currently, she denies any cardiovascular complaints.  Specifically, she denies chest pain or shortness of breath.  Further, there is no lightheadedness or dizziness.  She denies awareness of rapid heart rate, palpitations or missed beats.    Her telemetry monitor remains unremarkable.    From a cardiovascular standpoint the patient may be discharged home and follow-up with her primary cardiologist, Axel Ronquillo MD.  Discussed with RN events overnight.     Medications Personally Reviewed:    Current Facility-Administered Medications   Medication Dose Route Frequency    acetaminophen (TYLENOL) extended release tablet 650 mg  650 mg Oral PRN    glucose chewable tablet 16 g  4 tablet Oral PRN    dextrose bolus 10% 125 mL  125 mL IntraVENous PRN    Or    dextrose bolus 10% 250 mL  250 mL IntraVENous PRN    dextrose 10 % infusion   IntraVENous Continuous PRN    insulin lispro (HUMALOG,ADMELOG) injection vial 0-4 Units  0-4 Units SubCUTAneous TID WC    insulin lispro (HUMALOG,ADMELOG) injection vial 0-4 Units  0-4 Units SubCUTAneous Nightly    amLODIPine (NORVASC) tablet 5 mg  5 mg Oral Daily    aspirin EC tablet 81 mg  81 mg Oral Daily    carvedilol (COREG) tablet 25 mg  25 mg Oral BID  WC    Vitamin D (CHOLECALCIFEROL) tablet 1,000 Units  1,000 Units Oral BID    glipiZIDE (GLUCOTROL) tablet 5 mg  5 mg Oral BID AC    latanoprost (XALATAN) 0.005 % ophthalmic solution 1 drop  1 drop Both Eyes Daily    letrozole (FEMARA) tablet 2.5 mg  2.5 mg Oral Daily    levothyroxine (SYNTHROID) tablet 50 mcg  50 mcg Oral QAM AC    rosuvastatin (CRESTOR) tablet 10 mg  10 mg Oral Nightly    spironolactone (ALDACTONE) tablet 25 mg  25 mg Oral Daily    sodium chloride flush 0.9 % injection 5-40 mL  5-40 mL IntraVENous 2 times per day    sodium chloride flush 0.9 % injection 5-40 mL  5-40 mL IntraVENous PRN    0.9 % sodium chloride infusion   IntraVENous PRN    potassium chloride (KLOR-CON M) extended release tablet 40 mEq  40 mEq Oral PRN    Or    potassium bicarb-citric acid (EFFER-K) effervescent tablet 40 mEq  40 mEq Oral PRN    Or    potassium chloride 10 mEq/100 mL IVPB (Peripheral Line)  10 mEq IntraVENous PRN    magnesium sulfate 2000 mg in 50 mL IVPB premix  2,000 mg IntraVENous PRN    enoxaparin (LOVENOX) injection 40 mg  40 mg SubCUTAneous Daily    ondansetron (ZOFRAN-ODT) disintegrating tablet 4 mg  4 mg Oral Q8H PRN    Or    ondansetron (ZOFRAN) injection 4 mg  4 mg IntraVENous Q6H PRN    polyethylene glycol (GLYCOLAX) packet 17 g  17 g Oral Daily PRN    acetaminophen (TYLENOL) tablet 650 mg  650 mg Oral Q6H PRN    Or    acetaminophen (TYLENOL) suppository 650 mg  650 mg Rectal Q6H PRN    diphenhydrAMINE (BENADRYL) capsule 25 mg  25 mg Oral Nightly PRN           Objective:      Physical Exam:  Last 24hrs VS reviewed since prior progress note. Most recent are:    BP (!) 162/68   Pulse 58   Temp 97.9 °F (36.6 °C) (Oral)   Resp 18   Ht 1.588 m (5' 2.52\")   Wt 63.5 kg (140 lb)   SpO2 99%   BMI 25.18 kg/m²   No intake or output data in the 24 hours ending 07/10/24 0729     General Appearance: Well developed, no acute respiratory distress.  Chest: Lungs clear to auscultation bilaterally.  Cardiovascular:

## 2024-07-10 NOTE — DISCHARGE INSTR - COC
Continuity of Care Form    Patient Name: Nata Catalan   :  1945  MRN:  [id number]    Admit date:  2024  Discharge date:  07/10/2024    Code Status Order: Full Code   Advance Directives:     Admitting Physician:  Tre CHEN MD  PCP: Ronald Man MD    Discharging Nurse: Leilani MARCUM  Discharging Hospital Unit/Room#: 209/  Discharging Unit Phone Number: [phone]    Emergency Contact:   Extended Emergency Contact Information  Primary Emergency Contact: Ronald Catalan  Address: [address] [place] [address]  Home Phone: [phone]  Mobile Phone: [phone]  Relation: Spouse    Past Surgical History:  Past Surgical History:   Procedure Laterality Date    BACK SURGERY      implants placed between vertebrae     BREAST BIOPSY Right 2017    benign    BREAST LUMPECTOMY Left 2022    malignant    COLONOSCOPY      DILATION AND CURETTAGE OF UTERUS      HYSTERECTOMY (CERVIX STATUS UNKNOWN)      IR INSERT/REPLACE SPINE NEUROSTIM GENERATOR Left 2018    Pt states stimulator    PORT SURGERY N/A 2023    PORT REMOVAL performed by Latha White MD at [facility] MAIN OR    US BREAST BIOPSY W LOC DEVICE 1ST LESION LEFT Left 2022    US BREAST NEEDLE BIOPSY LEFT 3/23/2022 [facility] RAD MAMMO       Immunization History:     There is no immunization history on file for this patient.    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    Syncope and collapse R55       Isolation/Infection:   Isolation            No Isolation          Patient Infection Status       Infection Onset Added Last Indicated Last Indicated By Review Planned Expiration Resolved Resolved By    None active    Resolved    COVID-19 22 Conversion, Epic   22  Conversion, Epic            Nurse Assessment:  Last Vital Signs: BP 90/69   Pulse 58   Temp 98.2 °F (36.8 °C) (Oral)   Resp 17   Ht 1.588 m (5' 2.52\")   Wt 63.5 kg (140 lb)   SpO2 95%   BMI 25.18 kg/m²     Last documented pain score (0-10 scale): Pain Level: 0  Last Weight:   Wt Readings from Last 1 Encounters:   07/07/24 63.5 kg (140 lb)     Mental Status:  oriented    IV Access:  - None    Nursing Mobility/ADLs:  Walking   Independent  Transfer  Independent  Bathing  Independent  Dressing  Independent  Toileting  Independent  Feeding  Independent  Med Admin  Independent  Med Delivery   whole    Wound Care Documentation and Therapy:  Incision 08/09/23 Chest Right;Upper;Anterior (Active)   Number of days: 336        Elimination:  Continence:   Bowel: Yes  Bladder: Yes  Urinary Catheter: None   Colostomy/Ileostomy/Ileal Conduit: No       Date of Last BM:   No intake or output data in the 24 hours ending 07/10/24 1311  I/O last 3 completed shifts:  In: 240 [P.O.:240]  Out: 120 [Urine:120]    Safety Concerns:     None    Impairments/Disabilities:      None    Nutrition Therapy:  Current Nutrition Therapy:   - Oral Diet:  General    Routes of Feeding: Oral  Liquids: No Restrictions  Daily Fluid Restriction: no  Last Modified Barium Swallow with Video (Video Swallowing Test):     Treatments at the Time of Hospital Discharge:   Respiratory Treatments:   Oxygen Therapy:    Ventilator:        Rehab Therapies:   Weight Bearing Status/Restrictions:   Other Medical Equipment (for information only, NOT a DME order):    Other Treatments:     Patient's personal belongings (please select all that are sent with patient):  None    RN SIGNATURE:  Electronically signed by Leilani Hinton RN on 7/10/24 at 1:12 PM EDT    CASE MANAGEMENT/SOCIAL WORK SECTION    Inpatient Status Date: ***    Readmission Risk Assessment Score:  Readmission Risk              Risk of Unplanned Readmission:  16           Discharging to Facility/ Agency

## 2024-07-10 NOTE — PROGRESS NOTES
4 Eyes Skin Assessment     NAME:  Nata Catalan  YOB: 1945  MEDICAL RECORD NUMBER:  [id number]    The patient is being assessed for  Other weekly Weekly    I agree that at least one RN has performed a thorough Head to Toe Skin Assessment on the patient. ALL assessment sites listed below have been assessed.      Areas assessed by both nurses:    Head, Face, Ears, Shoulders, Back, Chest, Arms, Elbows, Hands, Sacrum. Buttock, Coccyx, Ischium, and Legs. Feet and Heels        Does the Patient have a Wound? No noted wound(s)       Toby Prevention initiated by RN: No  Wound Care Orders initiated by RN: No    Pressure Injury (Stage 3,4, Unstageable, DTI, NWPT, and Complex wounds) if present, place Wound referral order by RN under : No    New Ostomies, if present place, Ostomy referral order under : No     Nurse 1 eSignature: Electronically signed by Layla Doyle RN on 7/10/24 at 2:51 AM EDT    **SHARE this note so that the co-signing nurse can place an eSignature**    Nurse 2 eSignature: Electronically signed by Fredy Petersen RN on 7/10/24 at 4:21 AM EDT

## 2024-07-10 NOTE — PROGRESS NOTES
0602 Pt IV was removed and tele box. DC paperwork was reviewed with patient and . All questions were answered at this time. Pt will leave with  via car

## 2024-07-10 NOTE — CARE COORDINATION
DCP: home with spouse and [facility]      Transition of Care Plan:    RUR: 10%  Prior Level of Functioning: independent  Disposition: home with spouse and [facility]  If SNF or IPR: Date FOC offered: n/a  Date FOC received: n/a  Accepting facility: n/a  Date authorization started with reference number: n/a  Date authorization received and expires: n/a  Follow up appointments: unit secretary to setup as needed  DME needed: none  Transportation at discharge: family to transport  IM/IMM Medicare/ letter given: yes  Is patient a  and connected with VA? no  If yes, was  transfer form completed and VA notified? N/a  Caregiver Contact: n/a  Discharge Caregiver contacted prior to discharge? N/a  Care Conference needed? no  Barriers to discharge: none

## 2024-07-11 NOTE — PROGRESS NOTES
Physician Progress Note      PATIENT:               MICHAEL HAIDER  CSN #:                  [id number]  :                       1945  ADMIT DATE:       2024 11:18 AM  DISCH DATE:        7/10/2024 1:44 PM  RESPONDING  PROVIDER #:        Yuan Cox MD        QUERY TEXT:    Stage of Chronic Kidney Disease: Please provide further specificity, if known.    Clinical indicators include: chronic kidney disease, bun, creatinine,   bun/creatinine  Options provided:  -- Chronic kidney disease stage 1  -- Chronic kidney disease stage 2  -- Chronic kidney disease stage 3  -- Chronic kidney disease stage 3a  -- Chronic kidney disease stage 3b  -- Chronic kidney disease stage 4  -- Chronic kidney disease stage 5  -- Chronic kidney disease stage 5, requiring dialysis  -- End stage renal disease  -- Other - I will add my own diagnosis  -- Disagree - Not applicable / Not valid  -- Disagree - Clinically Unable to determine / Unknown        PROVIDER RESPONSE TEXT:    Provider dismissed this query because it was not applicable to the patient or   not a valid query.      Electronically signed by:  Yuan Cox MD 2024 9:15 AM

## 2024-07-25 NOTE — PROGRESS NOTES
Physician Progress Note      PATIENT:               MICHAEL HAIDER  CSN #:                  [id number]  :                       1945  ADMIT DATE:       2024 11:18 AM  DISCH DATE:        7/10/2024 1:44 PM  RESPONDING  PROVIDER #:        Tre Almanzar MD          QUERY TEXT:    Patient admitted with syncope. Noted to have dietician assessment with   malnutrition diagnosis in  note. If possible, please document in progress   notes and discharge summary if you are evaluating and /or treating any of the   following:    The medical record reflects the following:  Risk Factors: 80 yo female with poor intake, syncope, CHF  Clinical Indicators: Albumin 3.4;  Nutritional Consult: Moderate malnutrition   - Mild body fat loss Triceps- Mild muscle mass loss Clavicles (pectoralis &   deltoids), Thigh (quadriceps), Calf (gastrocnemius)  Treatment:  Continue to monitor and record PO intakes, BM in I/Os.; Regular   diet    Thank you,  Kimberly Rose RN, CCDS    ASPEN Criteria:    https://aspenjournals.onlinelibrary.benites.com/doi/full/10.1177/033302247166191  5  Options provided:  -- Protein calorie malnutrition moderate  -- Other - I will add my own diagnosis  -- Disagree - Not applicable / Not valid  -- Disagree - Clinically unable to determine / Unknown  -- Refer to Clinical Documentation Reviewer    PROVIDER RESPONSE TEXT:    This patient has moderate protein calorie malnutrition.    Query created by: Kimberly Rose on 2024 7:03 AM      Electronically signed by:  Tre Almanzar MD 2024 3:16 PM

## 2024-08-08 ENCOUNTER — HOSPITAL ENCOUNTER (OUTPATIENT)
Facility: HOSPITAL | Age: 79
Discharge: HOME | End: 2024-08-08
Attending: SURGERY
Payer: MEDICARE

## 2024-08-08 ENCOUNTER — HOSPITAL ENCOUNTER (OUTPATIENT)
Facility: HOSPITAL | Age: 79
End: 2024-08-08
Attending: SURGERY
Payer: MEDICARE

## 2024-08-08 DIAGNOSIS — C50.012 CARCINOMA OF NIPPLE AND AREOLA OF FEMALE BREAST, LEFT (HCC): ICD-10-CM

## 2024-08-08 DIAGNOSIS — C50.912 MALIGNANT NEOPLASM OF LEFT FEMALE BREAST, UNSPECIFIED ESTROGEN RECEPTOR STATUS, UNSPECIFIED SITE OF BREAST (HCC): ICD-10-CM

## 2024-08-08 PROCEDURE — G0279 TOMOSYNTHESIS, MAMMO: HCPCS

## 2025-01-13 ENCOUNTER — OFFICE VISIT (OUTPATIENT)
Age: 80
End: 2025-01-13
Payer: MEDICARE

## 2025-01-13 VITALS
OXYGEN SATURATION: 93 % | WEIGHT: 140.8 LBS | BODY MASS INDEX: 24.95 KG/M2 | HEART RATE: 70 BPM | TEMPERATURE: 97.9 F | RESPIRATION RATE: 16 BRPM | DIASTOLIC BLOOD PRESSURE: 76 MMHG | SYSTOLIC BLOOD PRESSURE: 136 MMHG | HEIGHT: 63 IN

## 2025-01-13 DIAGNOSIS — E11.65 TYPE 2 DIABETES MELLITUS WITH HYPERGLYCEMIA, WITH LONG-TERM CURRENT USE OF INSULIN (HCC): ICD-10-CM

## 2025-01-13 DIAGNOSIS — Z79.4 TYPE 2 DIABETES MELLITUS WITH HYPERGLYCEMIA, WITH LONG-TERM CURRENT USE OF INSULIN (HCC): Primary | ICD-10-CM

## 2025-01-13 DIAGNOSIS — Z79.4 TYPE 2 DIABETES MELLITUS WITH HYPERGLYCEMIA, WITH LONG-TERM CURRENT USE OF INSULIN (HCC): ICD-10-CM

## 2025-01-13 DIAGNOSIS — E11.65 TYPE 2 DIABETES MELLITUS WITH HYPERGLYCEMIA, WITH LONG-TERM CURRENT USE OF INSULIN (HCC): Primary | ICD-10-CM

## 2025-01-13 LAB — GLUCOSE, POC: 179 MG/DL

## 2025-01-13 PROCEDURE — 1126F AMNT PAIN NOTED NONE PRSNT: CPT | Performed by: STUDENT IN AN ORGANIZED HEALTH CARE EDUCATION/TRAINING PROGRAM

## 2025-01-13 PROCEDURE — 82962 GLUCOSE BLOOD TEST: CPT | Performed by: STUDENT IN AN ORGANIZED HEALTH CARE EDUCATION/TRAINING PROGRAM

## 2025-01-13 PROCEDURE — 99204 OFFICE O/P NEW MOD 45 MIN: CPT | Performed by: STUDENT IN AN ORGANIZED HEALTH CARE EDUCATION/TRAINING PROGRAM

## 2025-01-13 PROCEDURE — 1159F MED LIST DOCD IN RCRD: CPT | Performed by: STUDENT IN AN ORGANIZED HEALTH CARE EDUCATION/TRAINING PROGRAM

## 2025-01-13 PROCEDURE — 1123F ACP DISCUSS/DSCN MKR DOCD: CPT | Performed by: STUDENT IN AN ORGANIZED HEALTH CARE EDUCATION/TRAINING PROGRAM

## 2025-01-13 RX ORDER — DIPHENHYDRAMINE HCL 50 MG
50 CAPSULE ORAL NIGHTLY PRN
COMMUNITY

## 2025-01-13 RX ORDER — ANASTROZOLE 1 MG/1
1 TABLET ORAL DAILY
COMMUNITY

## 2025-01-13 ASSESSMENT — ENCOUNTER SYMPTOMS
EYES NEGATIVE: 1
GASTROINTESTINAL NEGATIVE: 1
DIARRHEA: 0
VOMITING: 0
CONSTIPATION: 0
RESPIRATORY NEGATIVE: 1
NAUSEA: 0
TROUBLE SWALLOWING: 0
SORE THROAT: 0
COUGH: 0
ABDOMINAL PAIN: 0
SHORTNESS OF BREATH: 0
EYE REDNESS: 0
EYE DISCHARGE: 0

## 2025-01-13 NOTE — PROGRESS NOTES
\"Have you been to the ER, urgent care clinic since your last visit?  Hospitalized since your last visit?\"    NO    “Have you seen or consulted any other health care providers outside our system since your last visit?”    NO    BS-179    Chief Complaint   Patient presents with    Diabetes    New Patient     /76 (Site: Right Upper Arm, Position: Sitting, Cuff Size: Medium Adult)   Pulse 70   Temp 97.9 °F (36.6 °C) (Temporal)   Resp 16   Ht 1.588 m (5' 2.5\")   Wt 63.9 kg (140 lb 12.8 oz)   SpO2 93%   BMI 25.34 kg/m²        
stimulator    PORT SURGERY N/A 8/9/2023    PORT REMOVAL performed by Latha White MD at [facility] MAIN OR    US BREAST BIOPSY W LOC DEVICE 1ST LESION LEFT Left 03/23/2022    US BREAST NEEDLE BIOPSY LEFT 3/23/2022 [facility] RAD MAMMO       Family History   Problem Relation Age of Onset    Heart Disease Mother     Cancer Father     Diabetes Mother     Breast Cancer Sister     Hypertension Mother         Current Outpatient Medications   Medication Sig    anastrozole (ARIMIDEX) 1 MG tablet Take 1 tablet by mouth daily    diphenhydrAMINE (BANOPHEN) 50 MG capsule Take 1 capsule by mouth nightly as needed    glipiZIDE (GLUCOTROL) 5 MG tablet Take 1 tablet by mouth 2 times daily (before meals)    glucose 4 g chewable tablet Take 4 tablets by mouth as needed for Low blood sugar    insulin glargine (LANTUS) 100 UNIT/ML injection vial Inject 10 Units into the skin nightly    spironolactone (ALDACTONE) 25 MG tablet Take 1 tablet by mouth daily    letrozole (FEMARA) 2.5 MG tablet Take 1 tablet by mouth daily    amLODIPine (NORVASC) 5 MG tablet Take 1 tablet by mouth daily    Aspirin (VAZALORE) 81 MG CAPS Take by mouth daily    carvedilol (COREG) 25 MG tablet Take by mouth 2 times daily (with meals)    Cholecalciferol 50 MCG (2000 UT) TABS Take by mouth 2 times daily    latanoprost (XALATAN) 0.005 % ophthalmic solution Apply 1 drop to eye    levothyroxine (SYNTHROID) 50 MCG tablet Take by mouth every morning (before breakfast)    rosuvastatin (CRESTOR) 10 MG tablet Take by mouth     No current facility-administered medications for this visit.       Social History     Socioeconomic History    Marital status:      Spouse name: Not on file    Number of children: Not on file    Years of education: Not on file    Highest education level: Not on file   Occupational History    Not on file   Tobacco Use    Smoking status: Never    Smokeless tobacco: Never   Substance and Sexual Activity    Alcohol use: Never    Drug use: Never

## 2025-01-17 LAB
ALBUMIN SERPL-MCNC: 4.5 G/DL (ref 3.8–4.8)
ALBUMIN/CREAT UR: 38 MG/G CREAT (ref 0–29)
ALP SERPL-CCNC: 63 IU/L (ref 44–121)
ALT SERPL-CCNC: 7 IU/L (ref 0–32)
AST SERPL-CCNC: 11 IU/L (ref 0–40)
BILIRUB SERPL-MCNC: 0.7 MG/DL (ref 0–1.2)
BUN SERPL-MCNC: 9 MG/DL (ref 8–27)
BUN/CREAT SERPL: 9 (ref 12–28)
CALCIUM SERPL-MCNC: 9.9 MG/DL (ref 8.7–10.3)
CHLORIDE SERPL-SCNC: 101 MMOL/L (ref 96–106)
CHOLEST SERPL-MCNC: 138 MG/DL (ref 100–199)
CO2 SERPL-SCNC: 23 MMOL/L (ref 20–29)
CREAT SERPL-MCNC: 0.96 MG/DL (ref 0.57–1)
CREAT UR-MCNC: 88.4 MG/DL
EGFRCR SERPLBLD CKD-EPI 2021: 60 ML/MIN/1.73
GLOBULIN SER CALC-MCNC: 2.1 G/DL (ref 1.5–4.5)
GLUCOSE SERPL-MCNC: 317 MG/DL (ref 70–99)
HDLC SERPL-MCNC: 35 MG/DL
LDLC SERPL CALC-MCNC: 74 MG/DL (ref 0–99)
MICROALBUMIN UR-MCNC: 33.8 UG/ML
POTASSIUM SERPL-SCNC: 4.4 MMOL/L (ref 3.5–5.2)
PROT SERPL-MCNC: 6.6 G/DL (ref 6–8.5)
SODIUM SERPL-SCNC: 139 MMOL/L (ref 134–144)
TRIGL SERPL-MCNC: 171 MG/DL (ref 0–149)
VLDLC SERPL CALC-MCNC: 29 MG/DL (ref 5–40)

## 2025-02-03 ENCOUNTER — TRANSCRIBE ORDERS (OUTPATIENT)
Facility: HOSPITAL | Age: 80
End: 2025-02-03

## 2025-02-03 DIAGNOSIS — C50.012 CARCINOMA OF NIPPLE AND AREOLA OF FEMALE BREAST, LEFT (HCC): Primary | ICD-10-CM

## 2025-02-03 DIAGNOSIS — C50.912 MALIGNANT NEOPLASM OF LEFT FEMALE BREAST, UNSPECIFIED ESTROGEN RECEPTOR STATUS, UNSPECIFIED SITE OF BREAST (HCC): ICD-10-CM

## 2025-03-17 ENCOUNTER — OFFICE VISIT (OUTPATIENT)
Age: 80
End: 2025-03-17
Payer: MEDICARE

## 2025-03-17 VITALS
BODY MASS INDEX: 24.03 KG/M2 | OXYGEN SATURATION: 98 % | TEMPERATURE: 97.7 F | HEART RATE: 58 BPM | WEIGHT: 135.6 LBS | DIASTOLIC BLOOD PRESSURE: 83 MMHG | RESPIRATION RATE: 16 BRPM | SYSTOLIC BLOOD PRESSURE: 143 MMHG | HEIGHT: 63 IN

## 2025-03-17 DIAGNOSIS — Z79.4 TYPE 2 DIABETES MELLITUS WITH HYPERGLYCEMIA, WITH LONG-TERM CURRENT USE OF INSULIN (HCC): Primary | ICD-10-CM

## 2025-03-17 DIAGNOSIS — E11.65 TYPE 2 DIABETES MELLITUS WITH HYPERGLYCEMIA, WITH LONG-TERM CURRENT USE OF INSULIN (HCC): Primary | ICD-10-CM

## 2025-03-17 LAB — GLUCOSE, POC: 310 MG/DL

## 2025-03-17 PROCEDURE — 1159F MED LIST DOCD IN RCRD: CPT | Performed by: STUDENT IN AN ORGANIZED HEALTH CARE EDUCATION/TRAINING PROGRAM

## 2025-03-17 PROCEDURE — 99203 OFFICE O/P NEW LOW 30 MIN: CPT | Performed by: STUDENT IN AN ORGANIZED HEALTH CARE EDUCATION/TRAINING PROGRAM

## 2025-03-17 PROCEDURE — 1123F ACP DISCUSS/DSCN MKR DOCD: CPT | Performed by: STUDENT IN AN ORGANIZED HEALTH CARE EDUCATION/TRAINING PROGRAM

## 2025-03-17 PROCEDURE — 1126F AMNT PAIN NOTED NONE PRSNT: CPT | Performed by: STUDENT IN AN ORGANIZED HEALTH CARE EDUCATION/TRAINING PROGRAM

## 2025-03-17 PROCEDURE — 82962 GLUCOSE BLOOD TEST: CPT | Performed by: STUDENT IN AN ORGANIZED HEALTH CARE EDUCATION/TRAINING PROGRAM

## 2025-03-17 RX ORDER — DONEPEZIL HYDROCHLORIDE 10 MG/1
10 TABLET, FILM COATED ORAL NIGHTLY
COMMUNITY
Start: 2025-03-12

## 2025-03-17 NOTE — PATIENT INSTRUCTIONS
Increase Lantus to 20 units at bedtime     Check your blood glucose before breakfast and before dinner on 3-4 days per week and before breakfast only on the other days

## 2025-03-17 NOTE — PROGRESS NOTES
[facility]                                      Patient Information Name : Nata Catalan 79 y.o.   YOB: 1945         Referred by: Ronald Man MD         Chief Complaint   Patient presents with    Follow-up    Diabetes       History of Present Illness: Nata Catalan is a 79 y.o. female here for follow up  visit of  Diabetes Mellitus.     Interval hx-   3-:   Doing well, compliant with medications; blood glucose logs reviewed    Background hx-   Diabetes mellitus was diagnosed 10 years ago on incidental labs.  . End organ effects of diabetes: none.         Monitoring frequency:1 /day and readings run between 104-260   Last A1C was high at 9.1%   Hypoglycemia: No    Weight trend: stable  Prior visit with dietician: no  Current diet:     Breakfast - oates and egg   Lunch -   Dinner- fish , sweet potato fries, beans   Current exercise: none    Current medications:     Glipizide 5 mg twice daily   Lantus insulin 10 units HS   Rosuvastatin 10 mg once daily     Eye exam current (within one year): no, 2 years ago   CELESTE: unknown     No chest pain,blurred vision,has hx congestive heart failure ,  no MI, no stroke or TIA   No history of pancreatitis, no hx medullary carcinoma     Wt Readings from Last 3 Encounters:   03/17/25 61.5 kg (135 lb 9.6 oz)   01/13/25 63.9 kg (140 lb 12.8 oz)   07/07/24 63.5 kg (140 lb)       BP Readings from Last 3 Encounters:   03/17/25 (!) 143/83   01/13/25 136/76   07/10/24 90/69           Past Medical History:   Diagnosis Date    Breast cancer (HCC)     left    Diabetes (HCC)     Heart failure (HCC)     High cholesterol     Hypertension     Hypothyroid     Seizures (HCC)     as a child       Past Surgical History:   Procedure Laterality Date    BACK SURGERY      implants placed between vertebrae     BREAST BIOPSY Right 2017    benign    BREAST LUMPECTOMY Left 04/2022    malignant    COLONOSCOPY      DILATION AND CURETTAGE OF

## 2025-03-17 NOTE — PROGRESS NOTES
\"Have you been to the ER, urgent care clinic since your last visit?  Hospitalized since your last visit?\"    NO    “Have you seen or consulted any other health care providers outside our system since your last visit?”    YES - When: approximately 1  weeks ago.  Where and Why: PCP.      BS-310    Chief Complaint   Patient presents with    Follow-up    Diabetes     BP (!) 167/83 (BP Site: Right Upper Arm, Patient Position: Sitting, BP Cuff Size: Medium Adult)   Pulse 58   Temp 97.7 °F (36.5 °C) (Temporal)   Resp 16   Ht 1.588 m (5' 2.5\")   Wt 61.5 kg (135 lb 9.6 oz)   SpO2 98%   BMI 24.41 kg/m²     BP (!) 143/83 (BP Site: Right Upper Arm, Patient Position: Sitting, BP Cuff Size: Medium Adult)   Pulse 58   Temp 97.7 °F (36.5 °C) (Temporal)   Resp 16   Ht 1.588 m (5' 2.5\")   Wt 61.5 kg (135 lb 9.6 oz)   SpO2 98%   BMI 24.41 kg/m²

## 2025-04-14 ENCOUNTER — OFFICE VISIT (OUTPATIENT)
Age: 80
End: 2025-04-14
Payer: MEDICARE

## 2025-04-14 VITALS
RESPIRATION RATE: 18 BRPM | HEART RATE: 73 BPM | TEMPERATURE: 98.6 F | BODY MASS INDEX: 24.33 KG/M2 | SYSTOLIC BLOOD PRESSURE: 132 MMHG | WEIGHT: 137.3 LBS | OXYGEN SATURATION: 97 % | HEIGHT: 63 IN | DIASTOLIC BLOOD PRESSURE: 72 MMHG

## 2025-04-14 DIAGNOSIS — E11.65 TYPE 2 DIABETES MELLITUS WITH HYPERGLYCEMIA, WITH LONG-TERM CURRENT USE OF INSULIN (HCC): Primary | ICD-10-CM

## 2025-04-14 DIAGNOSIS — Z79.4 TYPE 2 DIABETES MELLITUS WITH HYPERGLYCEMIA, WITH LONG-TERM CURRENT USE OF INSULIN (HCC): Primary | ICD-10-CM

## 2025-04-14 LAB — GLUCOSE, POC: 149 MG/DL

## 2025-04-14 PROCEDURE — 1159F MED LIST DOCD IN RCRD: CPT | Performed by: STUDENT IN AN ORGANIZED HEALTH CARE EDUCATION/TRAINING PROGRAM

## 2025-04-14 PROCEDURE — 1126F AMNT PAIN NOTED NONE PRSNT: CPT | Performed by: STUDENT IN AN ORGANIZED HEALTH CARE EDUCATION/TRAINING PROGRAM

## 2025-04-14 PROCEDURE — 82962 GLUCOSE BLOOD TEST: CPT | Performed by: STUDENT IN AN ORGANIZED HEALTH CARE EDUCATION/TRAINING PROGRAM

## 2025-04-14 PROCEDURE — 99212 OFFICE O/P EST SF 10 MIN: CPT | Performed by: STUDENT IN AN ORGANIZED HEALTH CARE EDUCATION/TRAINING PROGRAM

## 2025-04-14 PROCEDURE — 1123F ACP DISCUSS/DSCN MKR DOCD: CPT | Performed by: STUDENT IN AN ORGANIZED HEALTH CARE EDUCATION/TRAINING PROGRAM

## 2025-04-14 RX ORDER — GLIPIZIDE 5 MG/1
TABLET ORAL
Qty: 180 TABLET | Refills: 1 | Status: SHIPPED | OUTPATIENT
Start: 2025-04-14

## 2025-04-14 RX ORDER — INSULIN GLARGINE 100 [IU]/ML
INJECTION, SOLUTION SUBCUTANEOUS
Qty: 20 ML | Refills: 1 | Status: SHIPPED | OUTPATIENT
Start: 2025-04-14

## 2025-04-14 ASSESSMENT — ENCOUNTER SYMPTOMS
NAUSEA: 0
CONSTIPATION: 0
TROUBLE SWALLOWING: 0
EYE DISCHARGE: 0
COUGH: 0
DIARRHEA: 0
SHORTNESS OF BREATH: 0
EYE REDNESS: 0
GASTROINTESTINAL NEGATIVE: 1
ABDOMINAL PAIN: 0
SORE THROAT: 0
EYES NEGATIVE: 1
VOMITING: 0
RESPIRATORY NEGATIVE: 1

## 2025-04-14 NOTE — PROGRESS NOTES
\"Have you been to the ER, urgent care clinic since your last visit?  Hospitalized since your last visit?\"    NO    “Have you seen or consulted any other health care providers outside our system since your last visit?”    NO      Chief Complaint   Patient presents with    Follow-up    Diabetes     /72 (BP Site: Right Upper Arm, Patient Position: Sitting, BP Cuff Size: Medium Adult)   Pulse 73   Temp 98.6 °F (37 °C) (Temporal)   Resp 18   Ht 1.588 m (5' 2.5\")   Wt 62.3 kg (137 lb 4.8 oz)   SpO2 97%   BMI 24.71 kg/m²     BG- 149        
BIOPSY Right 2017    benign    BREAST LUMPECTOMY Left 04/2022    malignant    COLONOSCOPY      DILATION AND CURETTAGE OF UTERUS      HYSTERECTOMY (CERVIX STATUS UNKNOWN)      IR INSERT/REPLACE SPINE NEUROSTIM GENERATOR Left 11/19/2018    Pt states stimulator    PORT SURGERY N/A 8/9/2023    PORT REMOVAL performed by Latha White MD at [facility] MAIN OR    US BREAST BIOPSY W LOC DEVICE 1ST LESION LEFT Left 03/23/2022    US BREAST NEEDLE BIOPSY LEFT 3/23/2022 [facility] RAD MAMMO       Family History   Problem Relation Age of Onset    Heart Disease Mother     Cancer Father     Diabetes Mother     Breast Cancer Sister     Hypertension Mother         Current Outpatient Medications   Medication Sig    donepezil (ARICEPT) 10 MG tablet Take 1 tablet by mouth nightly    anastrozole (ARIMIDEX) 1 MG tablet Take 1 tablet by mouth daily    glipiZIDE (GLUCOTROL) 5 MG tablet Take 1 tablet by mouth 2 times daily (before meals)    glucose 4 g chewable tablet Take 4 tablets by mouth as needed for Low blood sugar    insulin glargine (LANTUS) 100 UNIT/ML injection vial Inject 10 Units into the skin nightly    spironolactone (ALDACTONE) 25 MG tablet Take 1 tablet by mouth daily    amLODIPine (NORVASC) 5 MG tablet Take 1 tablet by mouth daily    Aspirin (VAZALORE) 81 MG CAPS Take by mouth daily    carvedilol (COREG) 25 MG tablet Take by mouth 2 times daily (with meals)    Cholecalciferol 50 MCG (2000 UT) TABS Take by mouth 2 times daily    latanoprost (XALATAN) 0.005 % ophthalmic solution Apply 1 drop to eye    levothyroxine (SYNTHROID) 50 MCG tablet Take by mouth every morning (before breakfast)    rosuvastatin (CRESTOR) 10 MG tablet Take by mouth    diphenhydrAMINE (BANOPHEN) 50 MG capsule Take 1 capsule by mouth nightly as needed (Patient not taking: Reported on 4/14/2025)    letrozole (FEMARA) 2.5 MG tablet Take 1 tablet by mouth daily (Patient not taking: Reported on 4/14/2025)     No current facility-administered medications for

## 2025-04-14 NOTE — PATIENT INSTRUCTIONS
Increase Glipizide to 10 mg in the morning before breakfast  and continue to take 5 mg before dinner

## 2025-06-16 ENCOUNTER — HOSPITAL ENCOUNTER (OUTPATIENT)
Facility: HOSPITAL | Age: 80
Setting detail: RECURRING SERIES
Discharge: HOME | End: 2025-06-19
Payer: MEDICARE

## 2025-06-16 PROCEDURE — 97161 PT EVAL LOW COMPLEX 20 MIN: CPT

## 2025-06-16 PROCEDURE — 97110 THERAPEUTIC EXERCISES: CPT

## 2025-06-16 NOTE — THERAPY EVALUATION
[facility], [address]  [place] [address]  Ph: [phone]     Fax: [phone]       PHYSICAL THERAPY - MEDICARE EVALUATION/PLAN OF CARE NOTE (updated 3/23)      Date: 2025          Patient Name:  Nata Catalan :  1945   Medical   Diagnosis:  Lumbar radiculopathy [M54.16] Treatment Diagnosis:  M79.604  Pain in right leg  and M54.41  LUMBAGO WITH SCIATICA, RIGHT SIDE    Referral Source:  Maurizio Haider PA-C Provider #:  [id number]                Insurance: Payor: [facility] MEDICARE / Plan: [facility] MEDICARE ADVANTAGE / Product Type: *No Product type* /        Patient  verified yes     Visit #   Current  / Total 1 18   Time   In / Out 12:45pm 1:50pm   Total Treatment Time 55   Total Timed Codes 10   1:1 Treatment Time 10      [facility] Totals Reminder:  bill using total billable   min of TIMED therapeutic procedures and modalities.   8-22 min = 1 unit; 23-37 min = 2 units; 38-52 min = 3 units;  53-67 min = 4 units; 68-82 min = 5 units       SUBJECTIVE  If an interpreting service was utilized for treatment of this patient, the contents of this document represent the material reviewed with the patient via the .     Pain Level (0-10 scale): 3/10  []constant []intermittent []improving [x]worsening []no change since onset    Any medication changes, allergies to medications, adverse drug reactions, diagnosis change, or new procedure performed?: [x] No    [] Yes (see summary sheet for update)  Medications: Verified on Patient Summary List    Subjective functional status/changes:     Pt has \"throbbing\" pain down her R leg, especially in the outer calf.  The pain increases when she stands up or walks for longer periods.  She had spinal surgery over 10 years ago, but had to have it 3 times close together.  She did very well for many years, but just in the past 3 months, she started with the back and R leg pain.  She has been planting some

## 2025-06-20 ENCOUNTER — HOSPITAL ENCOUNTER (OUTPATIENT)
Facility: HOSPITAL | Age: 80
Setting detail: RECURRING SERIES
Discharge: HOME | End: 2025-06-23
Payer: MEDICARE

## 2025-06-20 PROCEDURE — 97110 THERAPEUTIC EXERCISES: CPT

## 2025-06-20 NOTE — PROGRESS NOTES
PHYSICAL THERAPY - MEDICARE DAILY TREATMENT NOTE (updated 3/23)      Date: 2025          Patient Name:  Nata Catalan :  1945   Medical   Diagnosis:  Lumbar radiculopathy [M54.16] Treatment Diagnosis:  M79.604  Pain in right leg  and M54.41  LUMBAGO WITH SCIATICA, RIGHT SIDE    Referral Source:  Maurizio Haider PA-C Insurance:   Payor: [facility] MEDICARE / Plan: [facility] MEDICARE ADVANTAGE / Product Type: *No Product type* /                     Patient  verified yes     Visit #   Current  / Total 2 18   Time   In / Out 10:15am 11:04am   Total Treatment Time 49   Total Timed Codes 39   1:1 Treatment Time 39      [facility] Totals Reminder:  bill using total billable   min of TIMED therapeutic procedures and modalities.   8-22 min = 1 unit; 23-37 min = 2 units; 38-52 min = 3 units; 53-67 min = 4 units; 68-82 min = 5 units            SUBJECTIVE  Pain Level (0-10 scale): 0/10    Any medication changes, allergies to medications, adverse drug reactions, diagnosis change, or new procedure performed?: [x] No    [] Yes (see summary sheet for update)  Medications: Verified on Patient Summary List    Subjective functional status/changes:     Pt reporting no pain currently. Has been doing HEP.     OBJECTIVE      Therapeutic Procedures:  Tx Min Billable or 1:1 Min (if diff from Tx Min) Procedure, Rationale, Specifics   39  39382 Therapeutic Exercise (timed):  increase ROM, strength, coordination, balance, and proprioception to improve patient's ability to progress to PLOF and address remaining functional goals. (see flow sheet as applicable)     Details if applicable:       36538 Neuromuscular Re-Education (timed):  improve balance, coordination, kinesthetic sense, posture, core stability and proprioception to improve patient's ability to develop conscious control of individual muscles and awareness of position of extremities in order to progress to PLOF and address remaining functional goals. (see flow sheet as

## 2025-06-26 ENCOUNTER — HOSPITAL ENCOUNTER (OUTPATIENT)
Facility: HOSPITAL | Age: 80
Setting detail: RECURRING SERIES
Discharge: HOME | End: 2025-06-29
Payer: MEDICARE

## 2025-06-26 PROCEDURE — 97110 THERAPEUTIC EXERCISES: CPT

## 2025-06-26 NOTE — PROGRESS NOTES
demonstrate improved postural awareness for sitting and standing and can verbalize reasoning and ergonomic factors related to this to decrease pain.      [] Met [] Not met [] Partially met  Date:     Pt will use proper sleeping techniques for pain prevention and ideal posture to prevent muscle shortening contributing to pain issues.       [] Met [] Not met [] Partially met  Date:     Long Term Goals: To be accomplished in 18 treatments.  Pt will have improved AMPAC score by 10%.        [] Met [] Not met [] Partially met Date:      Pt will be able to stand greater than 30 minutes without pain > 2-3/10 so they can do ADL's like wash dishes and light housework.      [] Met [] Not met [] Partially met Date:      Pt will be able to perform 8 STS in 30 seconds for improved trunk control with transfers and to decrease back/Leg pain.       [] Met [] Not met [] Partially met Date:      Pt will  have trunk flexion WFL to be able to bend down and put on socks/shoes without difficulty or increased pain.      [] Met [] Not met [] Partially met Date:      Pt will report centralization of symptoms/less radicular pain by 50%.      [] Met [] Not met [] Partially met Date:               PLAN  Treatment Plan may include any combination of the followin Therapeutic Exercise, 20079 Neuromuscular Re-Education, 67434 Manual Therapy, 05836 Electrical Stim unattended /  (MCR), and 80793 Gait Training  Frequency / Duration: Patient to be seen 1-2 times per week for 18 treatments.  YES Continue plan of care  Re-Cert Due: Certification Period ( 90/ [facility] 60 DAYS):  25-25  [x]  Upgrade activities as tolerated  []  Discharge due to:  []  Other:      Lexus Dimas, MICHAEL       2025       1:01 PM

## 2025-06-30 ENCOUNTER — HOSPITAL ENCOUNTER (OUTPATIENT)
Facility: HOSPITAL | Age: 80
Setting detail: RECURRING SERIES
Discharge: HOME | End: 2025-07-03
Payer: MEDICARE

## 2025-06-30 PROCEDURE — G0283 ELEC STIM OTHER THAN WOUND: HCPCS

## 2025-06-30 PROCEDURE — 97110 THERAPEUTIC EXERCISES: CPT

## 2025-06-30 PROCEDURE — 97140 MANUAL THERAPY 1/> REGIONS: CPT

## 2025-06-30 NOTE — PROGRESS NOTES
PHYSICAL THERAPY - MEDICARE DAILY TREATMENT NOTE (updated 3/23)      Date: 2025          Patient Name:  Nata Catalan :  1945   Medical   Diagnosis:  Lumbar radiculopathy [M54.16] Treatment Diagnosis:  M79.604  Pain in right leg  and M54.41  LUMBAGO WITH SCIATICA, RIGHT SIDE    Referral Source:  Maurizio Haider PA-C Insurance:   Payor: [facility] MEDICARE / Plan: [facility] MEDICARE ADVANTAGE / Product Type: *No Product type* /                     Patient  verified yes     Visit #   Current  / Total 4 18   Time   In / Out 1:06pm 2:10pm   Total Treatment Time 64   Total Timed Codes 49   1:1 Treatment Time 49      [facility] Totals Reminder:  bill using total billable   min of TIMED therapeutic procedures and modalities.   8-22 min = 1 unit; 23-37 min = 2 units; 38-52 min = 3 units; 53-67 min = 4 units; 68-82 min = 5 units            SUBJECTIVE  Pain Level (0-10 scale): 0/10    Any medication changes, allergies to medications, adverse drug reactions, diagnosis change, or new procedure performed?: [x] No    [] Yes (see summary sheet for update)  Medications: Verified on Patient Summary List    Subjective functional status/changes:     Pt states she has not had the sciatic pains like she was experiencing initially.  Pt reporting no pain currently, a little soreness yesterday.      OBJECTIVE      Therapeutic Procedures:  Tx Min Billable or 1:1 Min (if diff from Tx Min) Procedure, Rationale, Specifics   49  04387 Therapeutic Exercise (timed):  increase ROM, strength, coordination, balance, and proprioception to improve patient's ability to progress to PLOF and address remaining functional goals. (see flow sheet as applicable)     Details if applicable:       57265 Neuromuscular Re-Education (timed):  improve balance, coordination, kinesthetic sense, posture, core stability and proprioception to improve patient's ability to develop conscious control of individual muscles and awareness of position of extremities in  Bedside shift change report given to Madeleine Metzger RN (oncoming nurse) by JESUS Mendez RN (offgoing nurse). Report included the following information SBAR.

## 2025-07-07 ENCOUNTER — HOSPITAL ENCOUNTER (OUTPATIENT)
Facility: HOSPITAL | Age: 80
Setting detail: RECURRING SERIES
Discharge: HOME | End: 2025-07-10
Payer: MEDICARE

## 2025-07-07 PROCEDURE — 97110 THERAPEUTIC EXERCISES: CPT

## 2025-07-07 NOTE — PROGRESS NOTES
PHYSICAL THERAPY - MEDICARE DAILY TREATMENT NOTE (updated 3/23)      Date: 2025          Patient Name:  Nata Catalan :  1945   Medical   Diagnosis:  Lumbar radiculopathy [M54.16] Treatment Diagnosis:  M79.604  Pain in right leg  and M54.41  LUMBAGO WITH SCIATICA, RIGHT SIDE    Referral Source:  Maurizio Haider PA-C Insurance:   Payor: [facility] MEDICARE / Plan: [facility] MEDICARE ADVANTAGE / Product Type: *No Product type* /                     Patient  verified yes     Visit #   Current  / Total 5 18   Time   In / Out 9:50am 10:33am   Total Treatment Time 43   Total Timed Codes 43   1:1 Treatment Time 43       [facility] Totals Reminder:  bill using total billable   min of TIMED therapeutic procedures and modalities.   8-22 min = 1 unit; 23-37 min = 2 units; 38-52 min = 3 units; 53-67 min = 4 units; 68-82 min = 5 units            SUBJECTIVE  Pain Level (0-10 scale): 0/10    Any medication changes, allergies to medications, adverse drug reactions, diagnosis change, or new procedure performed?: [x] No    [] Yes (see summary sheet for update)  Medications: Verified on Patient Summary List    Subjective functional status/changes:     Pt states she has not really had any pain in the back or legs.  She is using the heating pad at home which helps.  She is still having a hard time balancing on a single leg and feels she may need the cane.  She reports sleeping on her L side without issue.  Feels she can stand 30 minutes but may get tired in the legs.  No issue bending over to don/doff socks and shoes.      OBJECTIVE      Therapeutic Procedures:  Tx Min Billable or 1:1 Min (if diff from Tx Min) Procedure, Rationale, Specifics   43  42510 Therapeutic Exercise (timed):  increase ROM, strength, coordination, balance, and proprioception to improve patient's ability to progress to PLOF and address remaining functional goals. (see flow sheet as applicable)     Details if applicable:       05227 Neuromuscular

## 2025-07-09 ENCOUNTER — HOSPITAL ENCOUNTER (OUTPATIENT)
Facility: HOSPITAL | Age: 80
Setting detail: RECURRING SERIES
Discharge: HOME | End: 2025-07-12
Payer: MEDICARE

## 2025-07-09 PROCEDURE — 97110 THERAPEUTIC EXERCISES: CPT

## 2025-07-09 NOTE — PROGRESS NOTES
PHYSICAL THERAPY - MEDICARE DAILY TREATMENT NOTE (updated 3/23)      Date: 2025          Patient Name:  Nata Catalan :  1945   Medical   Diagnosis:  Lumbar radiculopathy [M54.16] Treatment Diagnosis:  M79.604  Pain in right leg  and M54.41  LUMBAGO WITH SCIATICA, RIGHT SIDE    Referral Source:  Maurizio Haider PA-C Insurance:   Payor: [facility] MEDICARE / Plan: [facility] MEDICARE ADVANTAGE / Product Type: *No Product type* /                     Patient  verified yes     Visit #   Current  / Total 6 18   Time   In / Out 10:10am 10:44am   Total Treatment Time 34   Total Timed Codes 34   1:1 Treatment Time 34      [facility] Totals Reminder:  bill using total billable   min of TIMED therapeutic procedures and modalities.   8-22 min = 1 unit; 23-37 min = 2 units; 38-52 min = 3 units; 53-67 min = 4 units; 68-82 min = 5 units            SUBJECTIVE  Pain Level (0-10 scale): 0/10    Any medication changes, allergies to medications, adverse drug reactions, diagnosis change, or new procedure performed?: [x] No    [] Yes (see summary sheet for update)  Medications: Verified on Patient Summary List    Subjective functional status/changes:     Pt reports no pain this date, continues to have no c/o radicular symptoms.     OBJECTIVE      Therapeutic Procedures:  Tx Min Billable or 1:1 Min (if diff from Tx Min) Procedure, Rationale, Specifics   34  06237 Therapeutic Exercise (timed):  increase ROM, strength, coordination, balance, and proprioception to improve patient's ability to progress to PLOF and address remaining functional goals. (see flow sheet as applicable)     Details if applicable:       85425 Neuromuscular Re-Education (timed):  improve balance, coordination, kinesthetic sense, posture, core stability and proprioception to improve patient's ability to develop conscious control of individual muscles and awareness of position of extremities in order to progress to PLOF and address remaining functional goals.

## 2025-07-12 DIAGNOSIS — E11.65 TYPE 2 DIABETES MELLITUS WITH HYPERGLYCEMIA, WITH LONG-TERM CURRENT USE OF INSULIN (HCC): Primary | ICD-10-CM

## 2025-07-12 DIAGNOSIS — Z79.4 TYPE 2 DIABETES MELLITUS WITH HYPERGLYCEMIA, WITH LONG-TERM CURRENT USE OF INSULIN (HCC): Primary | ICD-10-CM

## 2025-07-14 ENCOUNTER — HOSPITAL ENCOUNTER (OUTPATIENT)
Facility: HOSPITAL | Age: 80
Setting detail: RECURRING SERIES
Discharge: HOME | End: 2025-07-17
Payer: MEDICARE

## 2025-07-14 PROCEDURE — 97110 THERAPEUTIC EXERCISES: CPT

## 2025-07-14 RX ORDER — GLIPIZIDE 5 MG/1
TABLET ORAL
Qty: 270 TABLET | Refills: 0 | Status: SHIPPED | OUTPATIENT
Start: 2025-07-14

## 2025-07-14 NOTE — PROGRESS NOTES
PHYSICAL THERAPY - MEDICARE DAILY TREATMENT NOTE (updated 3/23)      Date: 2025          Patient Name:  Nata Catalan :  1945   Medical   Diagnosis:  Lumbar radiculopathy [M54.16] Treatment Diagnosis:  M79.604  Pain in right leg  and M54.41  LUMBAGO WITH SCIATICA, RIGHT SIDE    Referral Source:  Maurizio Haider PA-C Insurance:   Payor: [facility] MEDICARE / Plan: [facility] MEDICARE ADVANTAGE / Product Type: *No Product type* /                     Patient  verified yes     Visit #   Current  / Total 7 18   Time   In / Out 10:06am 10:48am   Total Treatment Time 42   Total Timed Codes 42   1:1 Treatment Time 42      [facility] Totals Reminder:  bill using total billable   min of TIMED therapeutic procedures and modalities.   8-22 min = 1 unit; 23-37 min = 2 units; 38-52 min = 3 units; 53-67 min = 4 units; 68-82 min = 5 units            SUBJECTIVE  Pain Level (0-10 scale): 0/10    Any medication changes, allergies to medications, adverse drug reactions, diagnosis change, or new procedure performed?: [x] No    [] Yes (see summary sheet for update)  Medications: Verified on Patient Summary List    Subjective functional status/changes:     No pain reported currently.  Pt reports some pain about 3-4 nights ago but that was the last time.  She had a HA yesterday that lasted the whole day, unsure of cause.  No HA currently.      OBJECTIVE      Therapeutic Procedures:  Tx Min Billable or 1:1 Min (if diff from Tx Min) Procedure, Rationale, Specifics   42  87084 Therapeutic Exercise (timed):  increase ROM, strength, coordination, balance, and proprioception to improve patient's ability to progress to PLOF and address remaining functional goals. (see flow sheet as applicable)     Details if applicable:       26899 Neuromuscular Re-Education (timed):  improve balance, coordination, kinesthetic sense, posture, core stability and proprioception to improve patient's ability to develop conscious control of individual muscles

## 2025-07-16 ENCOUNTER — HOSPITAL ENCOUNTER (OUTPATIENT)
Facility: HOSPITAL | Age: 80
Setting detail: RECURRING SERIES
Discharge: HOME | End: 2025-07-19
Payer: MEDICARE

## 2025-07-16 PROCEDURE — 97110 THERAPEUTIC EXERCISES: CPT

## 2025-07-16 NOTE — PROGRESS NOTES
[facility] [address], [address]  [place] [address]  Ph: [phone]     Fax: [phone]    PHYSICAL THERAPY PROGRESS NOTE  Patient Name:  Nata Catalan :  1945   Treatment/Medical Diagnosis: Lumbar radiculopathy [M54.16]   Referral Source:  Maurizio Haider PA-C     Date of Initial Visit:  25 Attended Visits:  8 Missed Visits:  0     SUMMARY OF TREATMENT/ASSESSMENT:  Pt has completed one month of PT services showing improvement in pain, centralization of pain, lumbar ROM, soft tissue mobility, hip/knee flexibility, LE strength and activity tolerance.  She continues to have some tightness in the R hamstring, weakness in the gluteals, decreased SLS time and weakness in legs with prolonged standing.  Reviewed HEP to address impairments.  Pt has met 3 of 4 STGs and 4 of 5 LTGs at this time.  Will continue current POC.    Patient will continue to benefit from skilled PT / OT services to modify and progress therapeutic interventions, analyze and address functional mobility deficits, analyze and address strength deficits, analyze and address soft tissue restrictions, and analyze and cue for proper movement patterns to address functional deficits and attain remaining goals.    CURRENT STATUS/GOALS:  TRUNK ROM:   Flexion:                                  [] N/A  []WNL  [x]Minimal  []Moderate  [] Major   6.5\" fingertip to floor  Extension:                             [] N/A  [x]WNL  []Minimal  []Moderate  [] Major     Right Lateral Flexion:           [] N/A  [x]WNL  []Minimal  []Moderate  [] Major    Left Lateral Flexion:              [] N/A  [x]WNL  []Minimal  []Moderate  [] Major   Rotation to the Right:           [] N/A  []WNL  [x]Minimal  []Moderate  [] Major   Rotation to the Left:              [] N/A  []WNL  [x]Minimal  []Moderate  [] Major   Additional comments: no pain with lumbar ROM      Specific joints: *normal values in ()  Hip

## 2025-07-16 NOTE — PROGRESS NOTES
PHYSICAL THERAPY - MEDICARE DAILY TREATMENT NOTE (updated 3/23)      Date: 2025          Patient Name:  Nata Catalan :  1945   Medical   Diagnosis:  Lumbar radiculopathy [M54.16] Treatment Diagnosis:  M79.604  Pain in right leg  and M54.41  LUMBAGO WITH SCIATICA, RIGHT SIDE    Referral Source:  Maurizio Haider PA-C Insurance:   Payor: [facility] MEDICARE / Plan: [facility] MEDICARE ADVANTAGE / Product Type: *No Product type* /                     Patient  verified yes     Visit #   Current  / Total 8 18   Time   In / Out 9:57am 10:35am   Total Treatment Time 38   Total Timed Codes 38   1:1 Treatment Time 38       [facility] Totals Reminder:  bill using total billable   min of TIMED therapeutic procedures and modalities.   8-22 min = 1 unit; 23-37 min = 2 units; 38-52 min = 3 units; 53-67 min = 4 units; 68-82 min = 5 units            SUBJECTIVE  Pain Level (0-10 scale): 0/10    Any medication changes, allergies to medications, adverse drug reactions, diagnosis change, or new procedure performed?: [x] No    [] Yes (see summary sheet for update)  Medications: Verified on Patient Summary List    Subjective functional status/changes:     Pt reporting about 50% improvement since starting therapy.  She is no longer having the shooting pain down the leg.  She is sleeping well working on putting the pillows in btw the knees.  She has been trying to focus on her posture when sitting and standing.  Still fatigues in the legs some when standing for prolonged periods but otherwise doing well.     OBJECTIVE      Therapeutic Procedures:  Tx Min Billable or 1:1 Min (if diff from Tx Min) Procedure, Rationale, Specifics   38  29882 Therapeutic Exercise (timed):  increase ROM, strength, coordination, balance, and proprioception to improve patient's ability to progress to PLOF and address remaining functional goals. (see flow sheet as applicable)     Details if applicable:  reassessment completed      69790 Neuromuscular

## 2025-07-21 ENCOUNTER — APPOINTMENT (OUTPATIENT)
Facility: HOSPITAL | Age: 80
End: 2025-07-21
Payer: MEDICARE

## 2025-07-23 ENCOUNTER — APPOINTMENT (OUTPATIENT)
Facility: HOSPITAL | Age: 80
End: 2025-07-23
Payer: MEDICARE

## 2025-07-28 ENCOUNTER — APPOINTMENT (OUTPATIENT)
Facility: HOSPITAL | Age: 80
End: 2025-07-28
Payer: MEDICARE

## 2025-07-30 ENCOUNTER — HOSPITAL ENCOUNTER (OUTPATIENT)
Facility: HOSPITAL | Age: 80
Setting detail: RECURRING SERIES
Discharge: HOME | End: 2025-08-02
Payer: MEDICARE

## 2025-07-30 PROCEDURE — 97110 THERAPEUTIC EXERCISES: CPT

## 2025-07-30 NOTE — PROGRESS NOTES
PHYSICAL THERAPY - MEDICARE DAILY TREATMENT NOTE (updated 3/23)      Date: 2025          Patient Name:  Nata Catalan :  1945   Medical   Diagnosis:  Lumbar radiculopathy [M54.16] Treatment Diagnosis:  M79.604  Pain in right leg  and M54.41  LUMBAGO WITH SCIATICA, RIGHT SIDE    Referral Source:  Maurizio Haider PA-C Insurance:   Payor: [facility] MEDICARE / Plan: [facility] MEDICARE ADVANTAGE / Product Type: *No Product type* /                     Patient  verified yes     Visit #   Current  / Total 9 18   Time   In / Out 9:36am 10:17am   Total Treatment Time 41   Total Timed Codes 41   1:1 Treatment Time 41      [facility] Totals Reminder:  bill using total billable   min of TIMED therapeutic procedures and modalities.   8-22 min = 1 unit; 23-37 min = 2 units; 38-52 min = 3 units; 53-67 min = 4 units; 68-82 min = 5 units            SUBJECTIVE  Pain Level (0-10 scale): 0/10    Any medication changes, allergies to medications, adverse drug reactions, diagnosis change, or new procedure performed?: [x] No    [] Yes (see summary sheet for update)  Medications: Verified on Patient Summary List    Subjective functional status/changes:     Pt states she was on a 5 day [employer] retreat in [place] so she did not get a chance to do her exercises like she would like to.  Pt states over the past week max pain rating 5/10.  Pt states she did not have any of the shooting pains even though she was moving around a lot.      OBJECTIVE      Therapeutic Procedures:  Tx Min Billable or 1:1 Min (if diff from Tx Min) Procedure, Rationale, Specifics   41  94455 Therapeutic Exercise (timed):  increase ROM, strength, coordination, balance, and proprioception to improve patient's ability to progress to PLOF and address remaining functional goals. (see flow sheet as applicable)     Details if applicable:       96988 Neuromuscular Re-Education (timed):  improve balance, coordination, kinesthetic sense, posture, core stability and

## 2025-08-01 ENCOUNTER — HOSPITAL ENCOUNTER (OUTPATIENT)
Facility: HOSPITAL | Age: 80
Setting detail: RECURRING SERIES
Discharge: HOME | End: 2025-08-04
Payer: MEDICARE

## 2025-08-01 PROCEDURE — 97110 THERAPEUTIC EXERCISES: CPT

## 2025-08-04 ENCOUNTER — HOSPITAL ENCOUNTER (OUTPATIENT)
Facility: HOSPITAL | Age: 80
Setting detail: RECURRING SERIES
Discharge: HOME | End: 2025-08-07
Payer: MEDICARE

## 2025-08-04 PROCEDURE — G0283 ELEC STIM OTHER THAN WOUND: HCPCS

## 2025-08-04 PROCEDURE — 97110 THERAPEUTIC EXERCISES: CPT

## 2025-08-06 ENCOUNTER — HOSPITAL ENCOUNTER (OUTPATIENT)
Facility: HOSPITAL | Age: 80
Setting detail: RECURRING SERIES
Discharge: HOME | End: 2025-08-09
Payer: MEDICARE

## 2025-08-06 PROCEDURE — G0283 ELEC STIM OTHER THAN WOUND: HCPCS

## 2025-08-06 PROCEDURE — 97110 THERAPEUTIC EXERCISES: CPT

## 2025-08-25 ENCOUNTER — HOSPITAL ENCOUNTER (OUTPATIENT)
Facility: HOSPITAL | Age: 80
Discharge: HOME | End: 2025-08-28
Attending: SURGERY
Payer: MEDICARE

## 2025-08-25 DIAGNOSIS — C50.012 CARCINOMA OF NIPPLE AND AREOLA OF FEMALE BREAST, LEFT (HCC): ICD-10-CM

## 2025-08-25 DIAGNOSIS — C50.912 MALIGNANT NEOPLASM OF LEFT FEMALE BREAST, UNSPECIFIED ESTROGEN RECEPTOR STATUS, UNSPECIFIED SITE OF BREAST (HCC): ICD-10-CM

## 2025-08-25 PROCEDURE — G0279 TOMOSYNTHESIS, MAMMO: HCPCS

## (undated) DEVICE — DRAPE,LAPAROTOMY,PED,STERILE: Brand: MEDLINE

## (undated) DEVICE — SYR 5ML 1/5 GRAD LL NSAF LF --

## (undated) DEVICE — SPONGE GZ W4XL4IN COT 12 PLY TYP VII WVN C FLD DSGN STERILE

## (undated) DEVICE — GLOVE SURG SZ 65 CRM LTX FREE POLYISOPRENE POLYMER BEAD ANTI

## (undated) DEVICE — CONTAINER,SPECIMEN,PNEU TUBE,4OZ,OR STRL: Brand: MEDLINE

## (undated) DEVICE — SOL INJ SOD CL 0.9% 250ML BG --

## (undated) DEVICE — SYR 10ML CTRL LR LCK NSAF LF --

## (undated) DEVICE — COVER US PRB W4XL15IN GAM CRDLSS FLD

## (undated) DEVICE — DRAPE,REIN 53X77,STERILE: Brand: MEDLINE

## (undated) DEVICE — MINOR GENERAL PACK: Brand: MEDLINE INDUSTRIES, INC.

## (undated) DEVICE — SOUTHSIDE TURNOVER: Brand: MEDLINE INDUSTRIES, INC.

## (undated) DEVICE — GARMENT,MEDLINE,DVT,INT,CALF,MED, GEN2: Brand: MEDLINE

## (undated) DEVICE — SYRINGE MED 10ML LUERLOCK TIP W/O SFTY DISP

## (undated) DEVICE — SUT MONOCRYL PLUS UD 4-0 --

## (undated) DEVICE — SUT PROL 2-0 30IN SH BLU --

## (undated) DEVICE — BASIC SINGLE BASIN-LF: Brand: MEDLINE INDUSTRIES, INC.

## (undated) DEVICE — ELECTRODE PT RET AD L9FT HI MOIST COND ADH HYDRGEL CORDED

## (undated) DEVICE — Device: Brand: JELCO

## (undated) DEVICE — DRAPE EQUIP C ARM 74X42 IN MOB XR W/ TIE RUBBER BND LF

## (undated) DEVICE — INTENDED FOR TISSUE SEPARATION, AND OTHER PROCEDURES THAT REQUIRE A SHARP SURGICAL BLADE TO PUNCTURE OR CUT.: Brand: BARD-PARKER ® CARBON RIB-BACK BLADES

## (undated) DEVICE — 3-0 COATED VICRYL PLUS UNDYED 1X27" SH --

## (undated) DEVICE — PREP SKN CHLRAPRP APL 26ML STR --

## (undated) DEVICE — SUTURE PDS II SZ 3-0 L27IN ABSRB UD FS-2 L19MM 3/8 CIR REV Z423H

## (undated) DEVICE — GLOVE SURG SZ 7 L12IN FNGR THK79MIL GRN LTX FREE

## (undated) DEVICE — APPLICATOR MEDICATED 26 CC SOLUTION HI LT ORNG CHLORAPREP

## (undated) DEVICE — SUTURE VCRL + SZ 3-0 L27IN ABSRB UD L26MM SH 1/2 CIR VCP416H

## (undated) DEVICE — 48" PROBE COVER W/GEL, ULTRASOUND, STERILE: Brand: SITE-RITE

## (undated) DEVICE — BOWL MED M 16OZ PLAS CAP GRAD

## (undated) DEVICE — SUTURE MCRYL + SZ 4-0 L27IN ABSRB UD L19MM PS-2 3/8 CIR MCP426H

## (undated) DEVICE — PAD,ABDOMINAL,8"X7.5",STERILE,LF,1/PK: Brand: MEDLINE

## (undated) DEVICE — 3M™ TEGADERM™ TRANSPARENT FILM DRESSING FRAME STYLE, 1626W, 4 IN X 4-3/4 IN (10 CM X 12 CM), 50/CT 4CT/CASE: Brand: 3M™ TEGADERM™

## (undated) DEVICE — TOWEL SURG W17XL27IN STD BLU COT NONFENESTRATED PREWASHED

## (undated) DEVICE — DRAPE,TOP,102X53,STERILE: Brand: MEDLINE

## (undated) DEVICE — SPONGE LAP 18X18IN STRL -- 5/PK

## (undated) DEVICE — PACK,LAPAROTOMY,2 POLY GOWNS: Brand: MEDLINE

## (undated) DEVICE — DERMABOND SKIN ADH 0.7ML -- DERMABOND ADVANCED 12/BX

## (undated) DEVICE — YANKAUER,BULB TIP,W/O VENT,RIGID,STERILE: Brand: MEDLINE

## (undated) DEVICE — CLIP LIG M BLU TI HRT SHP WIRE HORZ 600 PER BX

## (undated) DEVICE — SYR 10ML LUER LOK 1/5ML GRAD --

## (undated) DEVICE — ULNAR NERVE PROTECTOR FOAM POSITIONER: Brand: CARDINAL HEALTH

## (undated) DEVICE — BRA SURG L BGE MARENA FR SNAP CMFRT WR